# Patient Record
Sex: FEMALE | Race: WHITE | NOT HISPANIC OR LATINO | Employment: OTHER | URBAN - METROPOLITAN AREA
[De-identification: names, ages, dates, MRNs, and addresses within clinical notes are randomized per-mention and may not be internally consistent; named-entity substitution may affect disease eponyms.]

---

## 2017-01-19 ENCOUNTER — ALLSCRIPTS OFFICE VISIT (OUTPATIENT)
Dept: OTHER | Facility: OTHER | Age: 62
End: 2017-01-19

## 2017-01-19 ENCOUNTER — HOSPITAL ENCOUNTER (OUTPATIENT)
Dept: RADIOLOGY | Facility: HOSPITAL | Age: 62
Discharge: HOME | End: 2017-01-19
Payer: COMMERCIAL

## 2017-01-19 DIAGNOSIS — Z98.84 BARIATRIC SURGERY STATUS: ICD-10-CM

## 2017-01-19 PROCEDURE — 74250 X-RAY XM SM INT 1CNTRST STD: CPT

## 2017-01-19 RX ADMIN — IOHEXOL 900 ML: 350 INJECTION, SOLUTION INTRAVENOUS at 17:04

## 2017-01-26 ENCOUNTER — HOSPITAL ENCOUNTER (OUTPATIENT)
Dept: MAMMOGRAPHY | Facility: CLINIC | Age: 62
Discharge: HOME | End: 2017-01-26
Payer: COMMERCIAL

## 2017-01-26 ENCOUNTER — HOSPITAL ENCOUNTER (OUTPATIENT)
Dept: ULTRASOUND IMAGING | Facility: CLINIC | Age: 62
Discharge: HOME | End: 2017-01-26
Payer: COMMERCIAL

## 2017-01-26 DIAGNOSIS — Z12.31 ENCOUNTER FOR SCREENING MAMMOGRAM FOR MALIGNANT NEOPLASM OF BREAST: ICD-10-CM

## 2017-01-26 DIAGNOSIS — D24.1 BENIGN NEOPLASM OF RIGHT BREAST: ICD-10-CM

## 2017-01-26 PROCEDURE — 76642 ULTRASOUND BREAST LIMITED: CPT

## 2017-01-26 PROCEDURE — G0202 SCR MAMMO BI INCL CAD: HCPCS

## 2017-01-27 ENCOUNTER — TRANSCRIBE ORDERS (OUTPATIENT)
Dept: ADMINISTRATIVE | Facility: HOSPITAL | Age: 62
End: 2017-01-27

## 2017-01-27 DIAGNOSIS — N63.10 LUMP OF RIGHT BREAST: Primary | ICD-10-CM

## 2017-04-10 ENCOUNTER — ALLSCRIPTS OFFICE VISIT (OUTPATIENT)
Dept: OTHER | Facility: OTHER | Age: 62
End: 2017-04-10

## 2017-04-10 ENCOUNTER — TRANSCRIBE ORDERS (OUTPATIENT)
Dept: ADMINISTRATIVE | Facility: HOSPITAL | Age: 62
End: 2017-04-10

## 2017-04-10 DIAGNOSIS — D24.1 BENIGN NEOPLASM OF RIGHT BREAST: Primary | ICD-10-CM

## 2017-07-25 ENCOUNTER — ALLSCRIPTS OFFICE VISIT (OUTPATIENT)
Dept: OTHER | Facility: OTHER | Age: 62
End: 2017-07-25

## 2017-07-25 DIAGNOSIS — Z00.00 ENCOUNTER FOR GENERAL ADULT MEDICAL EXAMINATION WITHOUT ABNORMAL FINDINGS: ICD-10-CM

## 2017-07-25 DIAGNOSIS — M85.80 OTHER SPECIFIED DISORDERS OF BONE DENSITY AND STRUCTURE, UNSPECIFIED SITE: ICD-10-CM

## 2017-07-25 DIAGNOSIS — F41.9 ANXIETY DISORDER: ICD-10-CM

## 2017-07-25 DIAGNOSIS — Z98.84 BARIATRIC SURGERY STATUS: ICD-10-CM

## 2017-07-25 DIAGNOSIS — K91.2 POSTSURGICAL MALABSORPTION, NOT ELSEWHERE CLASSIFIED: ICD-10-CM

## 2017-08-04 ENCOUNTER — HOSPITAL ENCOUNTER (OUTPATIENT)
Dept: ULTRASOUND IMAGING | Facility: CLINIC | Age: 62
Discharge: HOME | End: 2017-08-04
Payer: COMMERCIAL

## 2017-08-04 DIAGNOSIS — D24.1 BENIGN NEOPLASM OF RIGHT BREAST: ICD-10-CM

## 2017-08-04 PROCEDURE — 76642 ULTRASOUND BREAST LIMITED: CPT

## 2017-10-10 ENCOUNTER — TRANSCRIBE ORDERS (OUTPATIENT)
Dept: ADMINISTRATIVE | Facility: HOSPITAL | Age: 62
End: 2017-10-10

## 2017-10-10 ENCOUNTER — ALLSCRIPTS OFFICE VISIT (OUTPATIENT)
Dept: OTHER | Facility: OTHER | Age: 62
End: 2017-10-10

## 2017-10-10 DIAGNOSIS — Z12.39 SCREENING BREAST EXAMINATION: Primary | ICD-10-CM

## 2017-11-06 ENCOUNTER — ALLSCRIPTS OFFICE VISIT (OUTPATIENT)
Dept: OTHER | Facility: OTHER | Age: 62
End: 2017-11-06

## 2017-11-07 NOTE — PROGRESS NOTES
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)   3  Intraductal papilloma of right breast (217) (D24 1)   4  Osteopenia (733 90) (M85 80)   5  Encounter for routine gynecological examination (V72 31) (Z01 419)    Plan  Encounter for routine gynecological examination    · Follow-up visit in 1 year Evaluation and Treatment  Follow-up  Status: Hold For -  Scheduling  Requested for: 19FUW8859   Ordered; For: Encounter for routine gynecological examination; Ordered By: Shauna Sanchez Performed:  Due: 36ZLP8917    Discussion/Summary    1  Yearly examâPap smear deferred, self breast awareness reviewed, calcium/vitamin D recommendations discussed, mammogram request given, colonoscopy as per specialist History of Intraductal papilloma of the right breastâpatient was counseled about the findings  Pathology report demonstrated no atypia and no hyperplasia  This is reassuring  The patient continues to see Dr Rae Bernstein and myself at alternating 6 month intervals  Mammogram from January 2017 was negative with right breast ultrasound August 2017 being negative as wellFamily history of breast cancer-the patient's maternal grandmother had bilateral breast cancer and her mother had breast cancer and had bilateral mastectomy  It is unclear whether her mother had bilateral breast cancer  The patient had genetic evaluation by Dr Rae Bernstein but was not a candidate for testingLoss of height/osteopenia forearm-patient had DEXA scan February 2014 with normal bone density of the hip and spine with osteopenia in the forearm  Recommend calcium/vitamin D/weight-bearing exercise  Repeat DEXA scan age 70 is recommendedAtrophic vagina-patient without symptoms  Bladder cancer-patient had urinalysis August 2016 with large blood  She was referred to Urology and had bladder cancer diagnosed  She is receiving BCG treatment, which will be a 2 year course   She has cystoscopy every 3 months and is followed by [facility]  My support was given  The patient was appreciative of referral to Urology with diagnosis  Other medical-patient had bowel obstruction related to prior gastric bypass  She had a tube placed to relieve the obstruction which was in place for a few months time, now removed  She did have hypokalemia along with AFib and had cardioversion  Fortunately, this has all resolved  Other-her son with Down syndrome age 25 is being  in May 2018  She is very happy about this  My congratulations were given  she'll follow-up in one year or as needed  The patient has the current Goals: Reviewed  The patent has the current Barriers: None  Patient is able to Self-Care  PATIENT EDUCATION RECORD She was given the following educational materials: Calcium/vitamin-D sheet   Chief Complaint  1  Visit For: Preventive General Multisystem Exam    History of Present Illness  HPI: The patient was seen today for yearly exam  Please see assessment plan for details  Review of Systems    Constitutional: No fever, no chills, feels well, no tiredness, no recent weight gain or loss  ENT: no ear ache, no loss of hearing, no nosebleeds or nasal discharge, no sore throat or hoarseness  Cardiovascular: no complaints of slow or fast heart rate, no chest pain, no palpitations, no leg claudication or lower extremity edema  Respiratory: no complaints of shortness of breath, no wheezing, no dyspnea on exertion, no orthopnea or PND  Breasts: no complaints of breast pain, breast lump or nipple discharge  Gastrointestinal: no complaints of abdominal pain, no constipation, no nausea or diarrhea, no vomiting, no bloody stools  Genitourinary: no complaints of dysuria, no incontinence, no pelvic pain, no dysmenorrhea, no vaginal discharge or abnormal vaginal bleeding  Musculoskeletal: no complaints of arthralgia, no myalgia, no joint swelling or stiffness, no limb pain or swelling     Integumentary: no complaints of skin rash or lesion, no itching or dry skin, no skin wounds  Neurological: no complaints of headache, no confusion, no numbness or tingling, no dizziness or fainting  ROS reviewed  Active Problems  1  Abnormal weight gain (783 1) (R63 5)   2  Anxiety (300 00) (F41 9)   3  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)   4  Bladder cancer (188 9) (C67 9)   5  Dyslipidemia (272 4) (E78 5)   6  Encounter for routine gynecological examination (V72 31) (Z01 419)   7  Encounter for screening mammogram for malignant neoplasm of breast (V76 12)   (Z12 31)   8  History of self breast exam   9  Intraductal papilloma of right breast (217) (D24 1)   10  Irregular heart beat (427 9) (I49 9)   11  Low vitamin B12 level (266 2) (E53 8)   12  Mitral valve insufficiency and aortic valve insufficiency (396 3) (I08 0)   13  Obesity (278 00) (E66 9)   14  Obstructive sleep apnea (327 23) (G47 33)   15  Osteoarthritis (715 90) (M19 90)   16  Osteopenia (733 90) (M85 80)   17  Panic disorder (300 01) (F41 0)   18  Postgastrectomy malabsorption (579 3) (K91 2,Z90 3)   19  Pre-diabetes (790 29) (R73 03)   20  Screening for osteoporosis (V82 81) (Z13 820)   21  Small bowel obstruction (560 9) (K56 609)   22  Status post gastric bypass for obesity (V45 86) (Z98 84)   23  Urgency of urination (788 63) (R39 15)    Past Medical History   · History of  2 (V22 2) (Z33 1)   · History of Hearing loss, unspecified laterality   · History of cardiac murmur (V12 59) (Z86 79)   · History of hypercholesterolemia (V12 29) (Z86 39)   · History of pregnancy (V13 29)   · History of self breast exam   · History of Menarche (V21 8)   · History of Morbid obesity (278 01) (E66 01)   · History of Osteoarthritis (V13 4)   · History of Post-menopausal bleeding (627 1) (N95 0)   · History of Uses birth control (V25 9) (Z30 9)   · History of Wears glasses (V49 89) (Z97 3)    The active problems and past medical history were reviewed and updated today        Surgical History   · History of Arthroscopy Knee   · History of Biopsy Breast Percutaneous Needle Core   · History of Breast Surgery Excision Of Breast Single Lesion   · History of  Section   · History of Dilation And Curettage   · History of Epigastric Hernia Repair   · History of Gastric Surgery   · History of Gastric Surgery For Morbid Obesity Bypass With Lynda-en-Y   · History of Hysteroscopy   · History of Intestinal Surgery   · History of Knee Replacement   · History of Partial Gastrectomy   · History of Repair Of Paraesophageal Hiatus Hernia   · History of Revision Of Gastrojejunostomy   · History of Rotator Cuff Repair   · History of Shoulder Surgery   · History of Surgical Treatment Of Missed     The surgical history was reviewed and updated today  Family History  Mother    · Family history of breast cancer in female (V13 2) (Z80 2)  Father    · Family history of Emphysema  Sister    · Family history of Emphysema lung  Maternal Grandmother    · Family history of breast cancer in female (V16 3) (Z80 2)  Paternal Aunt    · Family history of Osteoporosis    The family history was reviewed and updated today  Social History   · Denied: History of Alcohol Use (History)   · Caffeine Use   · Denied: History of Drug Use   · Marital History - Currently    · Never A Smoker   · One child   · [address]   · Uses Safety Equipment - Seatbelts  The social history was reviewed and updated today  The social history was reviewed and is unchanged  Current Meds   1  BCG Vaccine; Therapy: (Recorded:2017) to Recorded   2  Biotin 5000 MCG Oral Tablet; i po qd; Therapy: (Recorded:2016) to Recorded   3  Metoprolol Tartrate 50 MG Oral Tablet; Therapy: (Recorded:2017) to Recorded   4  Multi-Vitamin TABS; TAKE 1 TABLET DAILY; Therapy: (Recorded:2016) to Recorded   5  Paxil 40 MG Oral Tablet; Therapy: (Recorded:2016) to Recorded   6   Simvastatin 20 MG Oral Tablet; Therapy: (Recorded:19Jan2017) to Recorded   7  TraMADol HCl - 50 MG Oral Tablet; Therapy: 61EHM8800 to (Last JN:81MXY6526)  Requested for: 11IRO7167 Ordered   8  Vitamin B12 TABS; Therapy: (Recorded:19Jan2017) to Recorded   9  Vitamin D3 2000 UNIT Oral Tablet; Therapy: (Recorded:06Nov2017) to Recorded    Allergies  1  No Known Drug Allergies  2  No Known Food Allergies    Vitals   Recorded: 45YAL5567 43:97VH   Systolic 307, LUE, Sitting   Diastolic 82, LUE, Sitting   Height 5 ft    Weight 167 lb 2 oz   BMI Calculated 32 64   BSA Calculated 1 73   LMP POSTMENOPAUSAL     Physical Exam    Constitutional   General appearance: No acute distress, well appearing and well nourished  Neck   Neck: Normal, supple, trachea midline, no masses  Thyroid: Normal, no thyromegaly  Genitourinary   External genitalia: Normal and no lesions appreciated  Vagina: Normal, no lesions or dryness appreciated  -- Mildly atrophic, without erythema or lesions or discharge  Urethra: Normal     Urethral meatus: Normal     Bladder: Normal, soft, non-tender and no prolapse or masses appreciated  Cervix: Normal, no palpable masses  -- Mildly atrophic, without lesions or cervicitis  No Cervical motion tenderness  Uterus: Normal, non-tender, not enlarged, and no palpable masses  -- Mid position, top-normal size, nontender, without mass  Adnexa/parametria: Normal, non-tender and no fullness or masses appreciated  Anus, perineum, and rectum: Normal sphincter tone, no masses, and no prolapse  Chest   Breasts: Normal and no dimpling or skin changes noted  Abdomen   Abdomen: Normal, non-tender, and no organomegaly noted  Liver and spleen: No hepatomegaly or splenomegaly  Examination for hernias: No hernias appreciated  Lymphatic   Palpation of lymph nodes in neck, axillae, groin and/or other locations: No lymphadenopathy or masses noted      Skin   Skin and subcutaneous tissue: Normal skin turgor and no rashes  Palpation of skin and subcutaneous tissue: Normal     Psychiatric   Orientation to person, place, and time: Normal     Mood and affect: Normal        Future Appointments    Date/Time Provider Specialty Site   04/10/2018 10:15 AM NARCISO [place]   [facility]urgical [facility]   07/25/2018 09:30 AM Ruspantini, Mallissa Goodpasture, [facility]     Signatures   Electronically signed by : NARCISO Harding ; Nov 6 2017 11:15AM EST                       (Author)

## 2017-12-08 NOTE — PROGRESS NOTES
Assessment    1  Encounter for screening mammogram for malignant neoplasm of breast (V76 12) (Z12 31)   2  Intraductal papilloma of right breast (217) (D24 1)   3  Family history of breast cancer in female (V16 3) (Z80 3) : Mother    Plan  Encounter for screening mammogram for malignant neoplasm of breast    · Follow-up visit in 6 months Evaluation and Treatment  Follow-up  Status: Complete Done: 70OLV2708   Ordered;Encounter for screening mammogram for malignant neoplasm of breast; Ordered By: Roderick Rebolledo Performed:  Due: 73LRO5295; Last Updated By: Franc Prado; 10/10/2017 9:57:00 AM   · * MAMMO SCREENING BILATERAL W CAD; Status:Active; Requested TRICIA:[id number]:72HT;    Perform:[facility]; Order Comments:[facility] [address] [place]; MEV:[id number]; Last Updated Janae Hart; 10/10/2017 9:57:00 AM;Ordered;for screening mammogram for malignant neoplasm of breast; Ordered By:Becca Garcia; Discussion/Summary  56 yo female with a stable papilloma of the right breast  She also has family history of breast cancer  No concerns on exam today  I will make arrangements for her next mammogram and see her again at that time  If there are no concerns, I will likely follow her on an annual basis  Chief Complaint  Chief Complaint Free Text Note Form: Pt is here for 6 month breast follow uphas no complaints at this timeimagin2017 US R (B2)Dr Jean and Dr Ashley Stevenson      History of Present Illness  Diagnosis and Staging: intraductal papilloma right breast      Review of Systems  Complete Female ROS SurgOnc:  Constitutional: recent weight loss  Active Problems    1  Abnormal weight gain (783 1) (R63 5)   2  Anxiety (300 00) (F41 9)   3  Dyslipidemia (272 4) (E78 5)   4  Encounter for screening mammogram for malignant neoplasm of breast (V76 12) (Z12 31)   5  History of self breast exam   6  Irregular heart beat (427 9) (I49 9)   7   Low vitamin B12 level (266 2) (E53 8)   8  Mitral valve insufficiency and aortic valve insufficiency (396 3) (I08 0)   9  Obesity (278 00) (E66 9)   10  Obstructive sleep apnea (327 23) (G47 33)   11  Osteoarthritis (715 90) (M19 90)   12  Panic disorder (300 01) (F41 0)   13  Postgastrectomy malabsorption (579 3) (K91 2,Z90 3)   14  Pre-diabetes (790 29) (R73 03)   15  Screening for osteoporosis (V82 81) (Z13 820)   16  Small bowel obstruction (560 9) (K56 609)   17  Status post gastric bypass for obesity (V45 86) (Z98 84)   18  Urgency of urination (788 63) (R39 15)    Past Medical History  1  History of  2 (V22 2) (Z33 1)   2  History of Hearing loss, unspecified laterality   3  History of cardiac murmur (V12 59) (Z86 79)   4  History of hypercholesterolemia (V12 29) (Z86 39)   5  History of pregnancy (V13 29)   6  History of self breast exam   7  History of Menarche (V21 8)   8  History of Morbid obesity (278 01) (E66 01)   9  History of Osteoarthritis (V13 4)   10  History of Post-menopausal bleeding (627 1) (N95 0)   11  History of Uses birth control (V25 9) (Z30 9)   12  History of Wears glasses (V49 89) (Z97 3)    Surgical History  1  History of Arthroscopy Knee   2  History of Biopsy Breast Percutaneous Needle Core   · 14   3  History of Breast Surgery Excision Of Breast Single Lesion   · 25 years ago   4  History of  Section   5  History of Dilation And Curettage   6  History of Epigastric Hernia Repair   7  History of Gastric Surgery   · Takedown of dural RNY  8  History of Gastric Surgery For Morbid Obesity Bypass With Lynda-en-Y   9  History of Hysteroscopy   10  History of Intestinal Surgery   11  History of Knee Replacement   · right and left knee replacement   12  History of Partial Gastrectomy   13  History of Repair Of Paraesophageal Hiatus Hernia   14  History of Revision Of Gastrojejunostomy   15  History of Rotator Cuff Repair   · left and right   16  History of Shoulder Surgery   17   History of Surgical Treatment Of Missed   Surgical History Reviewed: The surgical history was reviewed and updated today  Family History  Mother    1  Family history of breast cancer in female (V16 3) (Z80 3)  Father    2  Family history of Emphysema  Sister    3  Family history of Emphysema lung  Maternal Grandmother    4  Family history of breast cancer in female (V16 3) (Z80 3)  Paternal Aunt    11  Family history of Osteoporosis  Family History Reviewed: The family history was reviewed and updated today  Social History     · Denied: History of Alcohol Use (History)   · Caffeine Use   · Denied: History of Drug Use   · Marital History - Currently    · Never A Smoker   · One child   · [address]   · Uses Safety Equipment - Seatbelts  Social History Reviewed: The social history was reviewed and updated today  The social history was reviewed and is unchanged  Current Meds   1  B Complex TABS; Therapy: (Recorded:2017) to Recorded   2  Biotin 5000 MCG Oral Tablet; i po qd; Therapy: (Recorded:2016) to Recorded   3  Diclofenac Sodium 50 MG Oral Tablet Delayed Release; Therapy: (Recorded:2017) to Recorded   4  Metoprolol Tartrate 50 MG Oral Tablet; Therapy: (Recorded:2017) to Recorded   5  Multi-Vitamin TABS; TAKE 1 TABLET DAILY; Therapy: (Recorded:2016) to Recorded   6  Paxil 40 MG Oral Tablet; Therapy: (Recorded:2016) to Recorded   7  Simvastatin 20 MG Oral Tablet; Therapy: (Recorded:2017) to Recorded   8  TraMADol HCl - 50 MG Oral Tablet; Therapy: 64VRM9841 to (Last EQ:52GBC6987)  Requested for: 91CEY7873 Ordered   9  Vitamin B12 TABS; Therapy: (Recorded:2017) to Recorded  Medication List Reviewed: The medication list was reviewed and updated today  Allergies  1   No Known Drug Allergies    Vitals  Vital Signs    Recorded: 68KRA4944 09:26AM   Temperature 99 9 F   Heart Rate 67   Respiration 12   Systolic 218   Diastolic 94 Height 5 ft    Weight 165 lb    BMI Calculated 32 22   BSA Calculated 1 72   O2 Saturation 97       Physical Exam   Constitutional: General appearance: The Patient is well-developed, well-nourished female who appears her stated age in no acute distress  She is pleasant and talkative  Neuro: Grossly nonfocal  -- Orientation to person, place and time: Normal  -- Mood and affect: Normal    Lymphatic: no evidence of cervical adenopathy bilaterally  -- no evidence of axillary adenopathy bilaterally  Skin: Examination of Breast: Abnormal   Breast: Examination of both breasts revealed fibrocystic changes  Examination of the right breast revealed no erythema,-- no swelling-- and-- no tenderness  Examination of the left breast revealed no erythema,-- no swelling-- and-- no tenderness  Nipples appeared normal No discharge was noted from the nipples  No breast masses were palpable  Axillary nodes: no enlarged nodes  Results/Data  Diagnostic Studies Reviewed Surg Onc:  X-ray Review 8/4/17 right breast ultrasound stable  Future Appointments    Date/Time Provider Specialty Site   04/10/2018 10:15 AM NARCISO Sin  [facility]   07/25/2018 09:30 AM Citlali Thomas, [facility]     End of Encounter Meds    1  Paxil 40 MG Oral Tablet (PARoxetine HCl); Therapy: (Recorded:12Oct2016) to Recorded    2  Biotin 5000 MCG Oral Tablet; i po qd; Therapy: (Recorded:12Oct2016) to Recorded   3  Multi-Vitamin TABS; TAKE 1 TABLET DAILY; Therapy: (Recorded:12Oct2016) to Recorded    4  BCG Vaccine; Therapy: (450 45 295) to Recorded   5  Metoprolol Tartrate 50 MG Oral Tablet; Therapy: (Recorded:19Jan2017) to Recorded   6  Simvastatin 20 MG Oral Tablet; Therapy: (Recorded:19Jan2017) to Recorded   7  TraMADol HCl - 50 MG Oral Tablet; Therapy: 45ITU8395 to (Last HY:50UJW5627)  Requested for: 73WZG9900 Ordered   8  Vitamin B12 TABS;  Therapy: (Recorded:19Jan2017) to Recorded   9  Vitamin D3 2000 UNIT Oral Tablet;  Therapy: [phone]) to Recorded    Signatures   Electronically signed by : NARCISO Lezama ; Dec  7 2017  4:51PM EST                       (Author)

## 2018-01-10 NOTE — MISCELLANEOUS
Message   Recorded as Task   Date: 08/15/2016 01:45 PM, Created By: Chuckie Montanez   Task Name: Go to Result   Assigned To: Maikel Godwin   Regarding Patient: Kelly Rodrigez, Status: Active   CommentNoel Messing - 15 Aug 2016 9:45 AM     TASK CREATED  urine culture is negative, but UA with large blood  Given urine symptoms, rec see urology   Monique Mancera - 16 Aug 2016 5:04 AM     TASK REASSIGNED: Previously Assigned To Maikel Godwin      please have someone from your staff call this pt for an appt    Jeane Arriaza - 17 Aug 2016 12:24 PM     TASK REPLIED TO: Previously Assigned To [employer] actually shows no RBC  We will see patient next available  THank you  Marycruz Gil        Active Problems    1  Abnormal weight gain (783 1) (R63 5)   2  Anxiety (300 00) (F41 9)   3  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)   4  Breast disorder (611 9) (N64 9)   5  Breast self examination education, encounter for (V65 49) (Z71 89)   6  Cervical cancer screening (V76 2) (Z12 4)   7  Dense breasts (793 82) (R92 2)   8  Dyslipidemia (272 4) (E78 5)   9  Encounter for gynecological examination with Papanicolaou smear of cervix   (V72 31,V76 2) (Z01 419,Z12 4)   10  Encounter for screening mammogram for malignant neoplasm of breast (V76 12)    (Z12 31)   11  Frequency of urination (788 41) (R35 0)   12  History of self breast exam   13  Intraductal papilloma of right breast (217) (D24 1)   14  Irregular heart beat (427 9) (I49 9)   15  Mitral valve insufficiency and aortic valve insufficiency (396 3) (I08 0)   16  Obesity (278 00) (E66 9)   17  Obstructive sleep apnea (327 23) (G47 33)   18  Osteoarthritis (715 90) (M19 90)   19  Panic disorder (300 01) (F41 0)   20  Postgastrectomy malabsorption (579 3) (K91 2,Z90 3)   21  Pre-diabetes (790 29) (R73 09)   22  Screening for HPV (human papillomavirus) (V73 81) (Z11 51)   23  Screening for osteoporosis (V82 81) (Z13 820)   24   Status post gastric bypass for obesity (V45 86) (Z98 84)   25  Urgency of urination (788 63) (R39 15)    Current Meds   1  B Complex + C TR TBCR Recorded   2  Biotin 5000 MCG Oral Tablet; i po qd; Therapy: (7599-1301447) to Recorded   3  Calcium Citrate + D3 TABS; 500 mg i po tid qd; Therapy: (9118-5007797) to Recorded   4  Multivitamins Oral Capsule Recorded   5  Paxil 40 MG Oral Tablet (PARoxetine HCl); Therapy: (Naeem Kem) to Recorded   6  Toprol  MG Oral Tablet Extended Release 24 Hour (Metoprolol Succinate ER); Therapy: 75UWX8852 to (Last Rx:65Dui6385)  Requested for: 69GUZ4201 Ordered   7  TraMADol HCl - 50 MG Oral Tablet; Therapy: 79UKG8721 to (Last PE:73ONA9928)  Requested for: 98CAJ0818 Ordered   8  Vitamin D 2000 UNIT Oral Tablet Recorded   9  Voltaren-XR TB24 (Diclofenac Sodium ER) Recorded   10  Zocor 20 MG Oral Tablet (Simvastatin) Recorded    Allergies    1   No Known Drug Allergies    Signatures   Electronically signed by : Alo Antoine, ; Aug 18 2016  8:02AM EST                       (Author)

## 2018-01-11 NOTE — MISCELLANEOUS
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)   4  Breast disorder (611 9) (N64 9)   5  Breast self examination education, encounter for (V65 49) (Z71 89)   6  Cervical cancer screening (V76 2) (Z12 4)   7  Dense breasts (793 82) (R92 2)   8  Dyslipidemia (272 4) (E78 5)   9  Encounter for gynecological examination with Papanicolaou smear of cervix   (V72 31,V76 2) (Z01 419,Z12 4)   10  Encounter for screening mammogram for malignant neoplasm of breast (V76 12)    (Z12 31)   11  History of self breast exam   12  Irregular heart beat (427 9) (I49 9)   13  Mitral valve insufficiency and aortic valve insufficiency (396 3) (I08 0)   14  Obesity (278 00) (E66 9)   15  Obstructive sleep apnea (327 23) (G47 33)   16  Osteoarthritis (715 90) (M19 90)   17  Panic disorder (300 01) (F41 0)   18  Postgastrectomy malabsorption (579 3) (K91 2)   19  Pre-diabetes (790 29) (R73 09)   20  Screening for HPV (human papillomavirus) (V73 81) (Z11 51)   21  Screening for osteoporosis (V82 81) (Z13 820)   22  Status post gastric bypass for obesity (V45 86) (Z98 84)    Current Meds   1  B Complex + C TR TBCR Recorded   2  Biotin 5000 MCG Oral Tablet; i po qd; Therapy: (([phone]) to Recorded   3  Calcium Citrate + D3 TABS; 500 mg i po tid qd; Therapy: (([phone] [phone]) to Recorded   4  Multivitamins Oral Capsule Recorded   5  Paxil 40 MG Oral Tablet (PARoxetine HCl); Therapy: (Reji Needle) to Recorded   6   Toprol  MG Oral Tablet Extended Release 24 Hour (Metoprolol Succinate ER); Therapy: 55UTS0988 to (Last Rx:96Ouo4497)  Requested for: 31SYE0823 Ordered   7  TraMADol HCl - 50 MG Oral Tablet; Therapy: 80OHL4038 to (Last CR:33VNH9115)  Requested for: 66SNO5607 Ordered   8  Vitamin D 2000 UNIT Oral Tablet Recorded   9  Voltaren-XR TB24 (Diclofenac Sodium ER) Recorded   10  Zocor 20 MG Oral Tablet (Simvastatin) Recorded    Allergies    1  No Known Drug Allergies    Plan  Breast disorder    · US BREAST RIGHT LIMITED; Status:Hold For - Scheduling,Retrospective Authorization;   Requested for:03Twj4860;     Signatures   Electronically signed by : Maude Gale, ; Feb 2 2016  8:06AM EST                       (Author)

## 2018-01-12 VITALS
BODY MASS INDEX: 31.41 KG/M2 | HEIGHT: 60 IN | HEART RATE: 68 BPM | RESPIRATION RATE: 18 BRPM | SYSTOLIC BLOOD PRESSURE: 120 MMHG | TEMPERATURE: 98.2 F | DIASTOLIC BLOOD PRESSURE: 72 MMHG | WEIGHT: 160 LBS

## 2018-01-12 VITALS
BODY MASS INDEX: 32.39 KG/M2 | TEMPERATURE: 99.9 F | HEIGHT: 60 IN | HEART RATE: 67 BPM | DIASTOLIC BLOOD PRESSURE: 94 MMHG | OXYGEN SATURATION: 97 % | WEIGHT: 165 LBS | RESPIRATION RATE: 12 BRPM | SYSTOLIC BLOOD PRESSURE: 154 MMHG

## 2018-01-12 NOTE — MISCELLANEOUS
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)   4  Breast disorder (611 9) (N64 9)   5  Breast self examination education, encounter for (V65 49) (Z71 89)   6  Cervical cancer screening (V76 2) (Z12 4)   7  Dense breasts (793 82) (R92 2)   8  Dyslipidemia (272 4) (E78 5)   9  Encounter for gynecological examination with Papanicolaou smear of cervix   (V72 31,V76 2) (Z01 419,Z12 4)   10  Encounter for screening mammogram for malignant neoplasm of breast (V76 12)    (Z12 31)   11  History of self breast exam   12  Irregular heart beat (427 9) (I49 9)   13  Mitral valve insufficiency and aortic valve insufficiency (396 3) (I08 0)   14  Obesity (278 00) (E66 9)   15  Obstructive sleep apnea (327 23) (G47 33)   16  Osteoarthritis (715 90) (M19 90)   17  Panic disorder (300 01) (F41 0)   18  Postgastrectomy malabsorption (579 3) (K91 2)   19  Pre-diabetes (790 29) (R73 09)   20  Screening for HPV (human papillomavirus) (V73 81) (Z11 51)   21  Screening for osteoporosis (V82 81) (Z13 820)   22  Status post gastric bypass for obesity (V45 86) (Z98 84)    Current Meds   1  B Complex + C TR TBCR Recorded   2  Biotin 5000 MCG Oral Tablet; i po qd; Therapy: ([phone]) to Recorded   3  Calcium Citrate + D3 TABS; 500 mg i po tid qd; Therapy: ([phone]) to Recorded   4  Multivitamins Oral Capsule Recorded   5  Paxil 40 MG Oral Tablet (PARoxetine HCl); Therapy: (Julian Dick) to Recorded   6  Toprol  MG Oral Tablet Extended Release 24 Hour (Metoprolol Succinate ER);    Therapy: 81TTU3964 to (Last Rx:00Ejy0368)  Requested for: 15FTA2675 Ordered   7  TraMADol HCl - 50 MG Oral Tablet; Therapy: 56NGU7118 to (Last CO:20ECZ0633)  Requested for: 92VFU9210 Ordered   8  Vitamin D 2000 UNIT Oral Tablet Recorded   9  Voltaren-XR TB24 (Diclofenac Sodium ER) Recorded   10  Zocor 20 MG Oral Tablet (Simvastatin) Recorded    Allergies    1   No Known Drug Allergies    Signatures   Electronically signed by : Octavio Culver, ; Jan 25 2016  2:26PM EST                       (Author)

## 2018-01-13 VITALS
HEIGHT: 60 IN | TEMPERATURE: 98.3 F | RESPIRATION RATE: 14 BRPM | SYSTOLIC BLOOD PRESSURE: 140 MMHG | BODY MASS INDEX: 31.81 KG/M2 | HEART RATE: 61 BPM | WEIGHT: 162.03 LBS | DIASTOLIC BLOOD PRESSURE: 86 MMHG

## 2018-01-14 VITALS
RESPIRATION RATE: 16 BRPM | SYSTOLIC BLOOD PRESSURE: 140 MMHG | WEIGHT: 159 LBS | TEMPERATURE: 98.8 F | BODY MASS INDEX: 31.22 KG/M2 | HEART RATE: 73 BPM | HEIGHT: 60 IN | DIASTOLIC BLOOD PRESSURE: 84 MMHG

## 2018-01-14 VITALS
BODY MASS INDEX: 32.81 KG/M2 | SYSTOLIC BLOOD PRESSURE: 128 MMHG | DIASTOLIC BLOOD PRESSURE: 82 MMHG | WEIGHT: 167.13 LBS | HEIGHT: 60 IN

## 2018-01-15 NOTE — MISCELLANEOUS
Message   Recorded as Task   Date: 08/05/2016 08:01 AM, Created By: Valerie South   Task Name: Go to Result   Assigned To: Vanita Villalobos   Regarding Patient: Jalen Tidwell, Status: In Progress   CommentEspinozaafua LemaRenetta - 05 Aug 2016 8:01 AM     TASK CREATED  Please check to see if radiology contacted the patient about this result  If not, would recommend office visit with me to discuss   Nadiya Wiley - 05 Aug 2016 8:35 AM     TASK IN PROGRESS   Pt was not informed  Will schedule appt to discuss  Active Problems    1  Abnormal weight gain (783 1) (R63 5)   2  Anxiety (300 00) (F41 9)   3  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)   4  Breast disorder (611 9) (N64 9)   5  Breast self examination education, encounter for (V65 49) (Z71 89)   6  Cervical cancer screening (V76 2) (Z12 4)   7  Dense breasts (793 82) (R92 2)   8  Dyslipidemia (272 4) (E78 5)   9  Encounter for gynecological examination with Papanicolaou smear of cervix   (V72 31,V76 2) (Z01 419,Z12 4)   10  Encounter for screening mammogram for malignant neoplasm of breast (V76 12)    (Z12 31)   11  History of self breast exam   12  Irregular heart beat (427 9) (I49 9)   13  Mitral valve insufficiency and aortic valve insufficiency (396 3) (I08 0)   14  Obesity (278 00) (E66 9)   15  Obstructive sleep apnea (327 23) (G47 33)   16  Osteoarthritis (715 90) (M19 90)   17  Panic disorder (300 01) (F41 0)   18  Postgastrectomy malabsorption (579 3) (K91 2,Z90 3)   19  Pre-diabetes (790 29) (R73 09)   20  Screening for HPV (human papillomavirus) (V73 81) (Z11 51)   21  Screening for osteoporosis (V82 81) (Z13 820)   22  Status post gastric bypass for obesity (V45 86) (Z98 84)    Current Meds   1  B Complex + C TR TBCR Recorded   2  Biotin 5000 MCG Oral Tablet; i po qd; Therapy: (779 498 100) to Recorded   3  Calcium Citrate + D3 TABS; 500 mg i po tid qd; Therapy: (779 498 100) to Recorded   4   Multivitamins Oral Capsule Recorded   5  Paxil 40 MG Oral Tablet (PARoxetine HCl); Therapy: (Filbert Sos) to Recorded   6  Toprol  MG Oral Tablet Extended Release 24 Hour (Metoprolol Succinate ER); Therapy: 68WIB0558 to (Last Rx:36Wne2202)  Requested for: 26WXR9898 Ordered   7  TraMADol HCl - 50 MG Oral Tablet; Therapy: 16NVI4105 to (Last ME:81OBE3349)  Requested for: 10HBM1542 Ordered   8  Vitamin D 2000 UNIT Oral Tablet Recorded   9  Voltaren-XR TB24 (Diclofenac Sodium ER) Recorded   10  Zocor 20 MG Oral Tablet (Simvastatin) Recorded    Allergies    1   No Known Drug Allergies    Signatures   Electronically signed by : Elisabet Bundy, ; Aug  5 2016  8:43AM EST                       (Author)

## 2018-01-16 NOTE — MISCELLANEOUS
Message   Date: 05 Aug 2016 8:53 AM EST, Recorded By: Magnus De Souza   Calling For: Magnus De Souza   Caller: Tigist Fowler, Self   Phone: [phone] (Home), [phone] (Work)   Pt called back for copy of breast bx path report  Will  today  Has appt to discuss on 8/12/16  Active Problems    1  Abnormal weight gain (783 1) (R63 5)   2  Anxiety (300 00) (F41 9)   3  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)   4  Breast disorder (611 9) (N64 9)   5  Breast self examination education, encounter for (V65 49) (Z71 89)   6  Cervical cancer screening (V76 2) (Z12 4)   7  Dense breasts (793 82) (R92 2)   8  Dyslipidemia (272 4) (E78 5)   9  Encounter for gynecological examination with Papanicolaou smear of cervix   (V72 31,V76 2) (Z01 419,Z12 4)   10  Encounter for screening mammogram for malignant neoplasm of breast (V76 12)    (Z12 31)   11  History of self breast exam   12  Irregular heart beat (427 9) (I49 9)   13  Mitral valve insufficiency and aortic valve insufficiency (396 3) (I08 0)   14  Obesity (278 00) (E66 9)   15  Obstructive sleep apnea (327 23) (G47 33)   16  Osteoarthritis (715 90) (M19 90)   17  Panic disorder (300 01) (F41 0)   18  Postgastrectomy malabsorption (579 3) (K91 2,Z90 3)   19  Pre-diabetes (790 29) (R73 09)   20  Screening for HPV (human papillomavirus) (V73 81) (Z11 51)   21  Screening for osteoporosis (V82 81) (Z13 820)   22  Status post gastric bypass for obesity (V45 86) (Z98 84)    Current Meds   1  B Complex + C TR TBCR Recorded   2  Biotin 5000 MCG Oral Tablet; i po qd; Therapy: [phone]) to Recorded   3  Calcium Citrate + D3 TABS; 500 mg i po tid qd; Therapy: [phone]) to Recorded   4  Multivitamins Oral Capsule Recorded   5  Paxil 40 MG Oral Tablet (PARoxetine HCl); Therapy: (\[facility]\"") to Recorded   6  Toprol  MG Oral Tablet Extended Release 24 Hour (Metoprolol Succinate ER);    Therapy: 48KRG8659 to (Last IV:39GSX6026)  Requested for: 82MEQ1338 Ordered   7  TraMADol HCl - 50 MG Oral Tablet; Therapy: 37YDO6627 to (Last L07GZK7674)  Requested for: 22WSP9946 Ordered   8  Vitamin D 2000 UNIT Oral Tablet Recorded   9  Voltaren-XR TB24 (Diclofenac Sodium ER) Recorded   10  Zocor 20 MG Oral Tablet (Simvastatin) Recorded    Allergies    1   No Known Drug Allergies    Signatures   Electronically signed by : Brant Maurer, ; Aug  5 2016  8:54AM EST                       (Author)

## 2018-01-16 NOTE — MISCELLANEOUS
Message   Recorded as Task   Date: 01/13/2016 10:47 PM, Created By: Ross Stewart   Task Name: Go to Result   Assigned To: Fabiola Fernandez   Regarding Patient: Jose Olson, Status: Active   CommentOrnadineo Sindi - 13 Jan 2016 10:47 PM     TASK CREATED  Patient needs right breast mammogram in 6 months time  Please arrange  Thanks    Slip mailed to patient  Active Problems    1  Abnormal weight gain (783 1) (R63 5)   2  Anxiety (300 00) (F41 9)   3  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)   4  Breast disorder (611 9) (N64 9)   5  Breast self examination education, encounter for (V65 49) (Z71 89)   6  Cervical cancer screening (V76 2) (Z12 4)   7  Dense breasts (793 82) (R92 2)   8  Dyslipidemia (272 4) (E78 5)   9  Encounter for gynecological examination with Papanicolaou smear of cervix   (V72 31,V76 2) (Z01 419,Z12 4)   10  Encounter for screening mammogram for malignant neoplasm of breast (V76 12)    (Z12 31)   11  History of self breast exam   12  Irregular heart beat (427 9) (I49 9)   13  Mitral valve insufficiency and aortic valve insufficiency (396 3) (I08 0)   14  Obesity (278 00) (E66 9)   15  Obstructive sleep apnea (327 23) (G47 33)   16  Osteoarthritis (715 90) (M19 90)   17  Panic disorder (300 01) (F41 0)   18  Postgastrectomy malabsorption (579 3) (K91 2)   19  Pre-diabetes (790 29) (R73 09)   20  Screening for HPV (human papillomavirus) (V73 81) (Z11 51)   21  Screening for osteoporosis (V82 81) (Z13 820)   22  Status post gastric bypass for obesity (V45 86) (Z98 84)    Current Meds   1  B Complex + C TR TBCR Recorded   2  Biotin 5000 MCG Oral Tablet; i po qd; Therapy: [phone]) to Recorded   3  Calcium Citrate + D3 TABS; 500 mg i po tid qd; Therapy: [phone]) to Recorded   4  Multivitamins Oral Capsule Recorded   5  Paxil 40 MG Oral Tablet (PARoxetine HCl); Therapy: (Neita Evens) to Recorded   6   Toprol  MG Oral Tablet Extended Release 24 Hour (Metoprolol Succinate ER); Therapy: 00JWF7425 to (Last Rx:99Ekm5046)  Requested for: 59TMF6144 Ordered   7  TraMADol HCl - 50 MG Oral Tablet; Therapy: 19CRO5515 to (Last LR:88KSX3004)  Requested for: 06GUG4463 Ordered   8  Vitamin D 2000 UNIT Oral Tablet Recorded   9  Voltaren-XR TB24 (Diclofenac Sodium ER) Recorded   10  Zocor 20 MG Oral Tablet (Simvastatin) Recorded    Allergies    1  No Known Drug Allergies    Plan  Breast disorder    · US BREAST RIGHT LIMITED; Status:Hold For - Scheduling,Retrospective Authorization;   Requested for:25Hhj9724;     Signatures   Electronically signed by : Hector Phelan, ; Gregory 15 2016 10:14AM EST                       (Author)

## 2018-01-29 ENCOUNTER — TRANSCRIBE ORDERS (OUTPATIENT)
Dept: MAMMOGRAPHY | Facility: CLINIC | Age: 63
End: 2018-01-29

## 2018-01-29 ENCOUNTER — HOSPITAL ENCOUNTER (OUTPATIENT)
Dept: MAMMOGRAPHY | Facility: CLINIC | Age: 63
Discharge: HOME | End: 2018-01-29
Payer: COMMERCIAL

## 2018-01-29 DIAGNOSIS — Z12.31 ENCOUNTER FOR SCREENING MAMMOGRAM FOR MALIGNANT NEOPLASM OF BREAST: ICD-10-CM

## 2018-01-29 DIAGNOSIS — Z12.39 SCREENING BREAST EXAMINATION: Primary | ICD-10-CM

## 2018-01-29 DIAGNOSIS — Z12.39 SCREENING BREAST EXAMINATION: ICD-10-CM

## 2018-01-29 PROCEDURE — 77067 SCR MAMMO BI INCL CAD: CPT

## 2018-04-10 ENCOUNTER — OFFICE VISIT (OUTPATIENT)
Dept: SURGICAL ONCOLOGY | Facility: CLINIC | Age: 63
End: 2018-04-10
Payer: COMMERCIAL

## 2018-04-10 VITALS
RESPIRATION RATE: 18 BRPM | TEMPERATURE: 99.1 F | SYSTOLIC BLOOD PRESSURE: 120 MMHG | HEART RATE: 98 BPM | HEIGHT: 60 IN | WEIGHT: 170 LBS | BODY MASS INDEX: 33.38 KG/M2 | DIASTOLIC BLOOD PRESSURE: 80 MMHG

## 2018-04-10 DIAGNOSIS — Z80.3 FAMILY HISTORY OF BREAST CANCER IN FEMALE: Primary | ICD-10-CM

## 2018-04-10 DIAGNOSIS — Z12.31 SCREENING MAMMOGRAM, ENCOUNTER FOR: ICD-10-CM

## 2018-04-10 DIAGNOSIS — R92.2 DENSE BREAST TISSUE: ICD-10-CM

## 2018-04-10 DIAGNOSIS — D24.1 INTRADUCTAL PAPILLOMA OF BREAST, RIGHT: ICD-10-CM

## 2018-04-10 PROCEDURE — 99213 OFFICE O/P EST LOW 20 MIN: CPT | Performed by: SURGERY

## 2018-04-10 RX ORDER — APIXABAN 5 MG/1
TABLET, FILM COATED ORAL
COMMUNITY
Start: 2018-03-26

## 2018-04-10 NOTE — LETTER
April 10, 2018     Eleanor Hsu MD  [address]  [address]    Patient: Jesús Tomlinson   YOB: 1955   Date of Visit: 4/10/2018       Dear Dr Melyssa Mendez:    Thank you for referring Laura Steve to me for evaluation  Below are my notes for this consultation  If you have questions, please do not hesitate to call me  I look forward to following your patient along with you  Sincerely,        Adelaida Schmidt MD        CC: No Recipients  Adelaida Schmidt MD  4/10/2018 10:49 AM  Sign at close encounter     Surgical Oncology Follow Up       [facility]  [address] [facility]  1955  [id number]  [facility]  [facility] [address] [address]    Chief Complaint   Patient presents with    Breast Problem     Pt is here for 6 month follow up        Assessment/Plan   Diagnoses and all orders for this visit:    Family history of breast cancer in female    Intraductal papilloma of breast, right    Dense breast tissue    Screening mammogram, encounter for  -     Mammo screening bilateral w 3d & cad; Future            Advance Care Planning/Advance Directives:  Did not discuss  with the patient  Oncology History:     No history exists  History of Present Illness: follow up  -Interval History:none    Review of Systems:  Review of Systems   Constitutional: Negative  Negative for appetite change and fever  Eyes: Negative  Respiratory: Negative for shortness of breath  Cardiovascular: Negative  Gastrointestinal: Negative  Endocrine: Negative  Genitourinary: Negative  Musculoskeletal: Negative  Negative for arthralgias and myalgias  Skin: Negative  Allergic/Immunologic: Negative  Neurological: Negative  Hematological: Negative  Negative for adenopathy  Does not bruise/bleed easily  Psychiatric/Behavioral: Negative  Patient Active Problem List   Diagnosis    Intraductal papilloma of breast, right    Family history of breast cancer in female    Dense breast tissue    Screening mammogram, encounter for     Past Medical History:   Diagnosis Date    Abnormal weight gain     Anxiety     Dyslipidemia     Gastric bypass status for obesity     H/O cardiac murmur     H/O hearing loss     H/O: osteoarthritis     History of postmenopausal bleeding     Hx of hypercholesterolemia     Intraductal papilloma of breast, right     Irregular heart beat     Low vitamin B12 level     Mitral valve insufficiency and aortic valve insufficiency     Morbid obesity (HCC)     Obesity     Obstructive sleep apnea     Osteoarthritis     Panic disorder     Postgastrectomy malabsorption     Prediabetes     Small bowel obstruction (HCC)     Urgency of urination     Wears glasses      Past Surgical History:   Procedure Laterality Date    ARTHROSCOPY KNEE      BREAST BIOPSY  2016    needle core    BREAST SURGERY      25 yrs ago excision of single breast lesion     SECTION      DILATION AND CURETTAGE OF UTERUS      EPIGASTRIC HERNIA REPAIR      HIATAL HERNIA REPAIR      HYSTERECTOMY      PARTIAL GASTRECTOMY      REPLACEMENT TOTAL KNEE BILATERAL      ROTATOR CUFF REPAIR      bilat    LEO-EN-Y PROCEDURE      SHOULDER SURGERY      SMALL INTESTINE SURGERY      STOMACH SURGERY      STOMACH SURGERY      revision of gastrojejunostomy    THERAPEUTIC       treatment of missed      Family History   Problem Relation Age of Onset    Breast cancer Mother     Breast cancer Maternal Grandmother      Social History     Social History    Marital status: /Civil Union     Spouse name: N/A    Number of children: N/A    Years of education: N/A     Occupational History    Not on file       Social History Main Topics    Smoking status: Never Smoker    Smokeless tobacco: Never Used    Alcohol use No    Drug use: Unknown    Sexual activity: Not on file     Other Topics Concern    Not on file     Social History Narrative    No narrative on file       Current Outpatient Prescriptions:     B Complex-C (B-COMPLEX WITH VITAMIN C) tablet, Take 1 tablet by mouth daily  , Disp: , Rfl:     bcg vaccine (THERACYS) 81 MG/VIAL, by Intravesical route, Disp: , Rfl:     Biotin 5000 MCG TABS, Take by mouth , Disp: , Rfl:     Calcium Citrate-Vitamin D 500-500 MG-UNIT PACK, Take by mouth , Disp: , Rfl:     Cholecalciferol (VITAMIN D) 2000 UNITS CAPS, Take by mouth , Disp: , Rfl:     Diclofenac Sodium ER (VOLTAREN-XR) 100 MG 24 hr tablet, Take 100 mg by mouth daily  , Disp: , Rfl:     ELIQUIS 5 MG, , Disp: , Rfl:     metoprolol succinate (TOPROL-XL) 100 mg 24 hr tablet, Take 100 mg by mouth daily  , Disp: , Rfl:     Multiple Vitamin (MULTIVITAMIN) tablet, Take 1 tablet by mouth daily  , Disp: , Rfl:     PARoxetine (PAXIL) 40 MG tablet, Take 40 mg by mouth every morning , Disp: , Rfl:     simvastatin (ZOCOR) 20 mg tablet, Take 20 mg by mouth daily at bedtime  , Disp: , Rfl:     traMADol (ULTRAM) 50 mg tablet, Take 50 mg by mouth every 6 (six) hours as needed for moderate pain , Disp: , Rfl:   No Known Allergies    The following portions of the patient's history were reviewed and updated as appropriate: allergies, current medications, past family history, past medical history, past social history, past surgical history and problem list         Vitals:    04/10/18 1028   BP: 120/80   Pulse: 98   Resp: 18   Temp: 99 1 °F (37 3 °C)       Physical Exam   Constitutional: She is oriented to person, place, and time  She appears well-developed and well-nourished  HENT:   Head: Normocephalic and atraumatic  Pulmonary/Chest: Right breast exhibits no inverted nipple, no mass, no nipple discharge, no skin change and no tenderness   Left breast exhibits no inverted nipple, no mass, no nipple discharge, no skin change and no tenderness  Lymphadenopathy:        Right axillary: No pectoral and no lateral adenopathy present  Left axillary: No pectoral and no lateral adenopathy present  Right: No supraclavicular adenopathy present  Left: No supraclavicular adenopathy present  Neurological: She is alert and oriented to person, place, and time  Psychiatric: She has a normal mood and affect  Results:      Imaging  01/29/2018 bilateral screening mammogram is benign BI-RADS two density three    I reviewed the above  imaging data  Discussion/Summary:  26-year-old female with a history of breast cancer in her mother, intraductal papilloma of the right breast and dense breast tissue  There are no concerns on examination today  Her recent mammogram was benign  I will continue to follow her on an annual basis along with her gynecologist, Dr Wenceslao Odell  I advised her to return sooner should the need arise

## 2018-04-10 NOTE — PROGRESS NOTES
Surgical Oncology Follow Up       [facility]  [facility] [place]  [address] [facility]  1955  [id number]  [address]   [facility] [address]    Chief Complaint   Patient presents with    Breast Problem     Pt is here for 6 month follow up        Assessment/Plan   Diagnoses and all orders for this visit:    Family history of breast cancer in female    Intraductal papilloma of breast, right    Dense breast tissue    Screening mammogram, encounter for  -     Mammo screening bilateral w 3d & cad; Future            Advance Care Planning/Advance Directives:  Did not discuss  with the patient  Oncology History:     No history exists  History of Present Illness: follow up  -Interval History:none    Review of Systems:  Review of Systems   Constitutional: Negative  Negative for appetite change and fever  Eyes: Negative  Respiratory: Negative for shortness of breath  Cardiovascular: Negative  Gastrointestinal: Negative  Endocrine: Negative  Genitourinary: Negative  Musculoskeletal: Negative  Negative for arthralgias and myalgias  Skin: Negative  Allergic/Immunologic: Negative  Neurological: Negative  Hematological: Negative  Negative for adenopathy  Does not bruise/bleed easily  Psychiatric/Behavioral: Negative          Patient Active Problem List   Diagnosis    Intraductal papilloma of breast, right    Family history of breast cancer in female    Dense breast tissue    Screening mammogram, encounter for     Past Medical History:   Diagnosis Date    Abnormal weight gain     Anxiety     Dyslipidemia     Gastric bypass status for obesity     H/O cardiac murmur     H/O hearing loss     H/O: osteoarthritis     History of postmenopausal bleeding     Hx of hypercholesterolemia     Intraductal papilloma of breast, right     Irregular heart beat     Low vitamin B12 level     Mitral valve insufficiency and aortic valve insufficiency     Morbid obesity (HCC)     Obesity     Obstructive sleep apnea     Osteoarthritis     Panic disorder     Postgastrectomy malabsorption     Prediabetes     Small bowel obstruction (HCC)     Urgency of urination     Wears glasses      Past Surgical History:   Procedure Laterality Date    ARTHROSCOPY KNEE      BREAST BIOPSY  2016    needle core    BREAST SURGERY      25 yrs ago excision of single breast lesion     SECTION      DILATION AND CURETTAGE OF UTERUS      EPIGASTRIC HERNIA REPAIR      HIATAL HERNIA REPAIR      HYSTERECTOMY      PARTIAL GASTRECTOMY      REPLACEMENT TOTAL KNEE BILATERAL      ROTATOR CUFF REPAIR      bilat    LEO-EN-Y PROCEDURE      SHOULDER SURGERY      SMALL INTESTINE SURGERY      STOMACH SURGERY      STOMACH SURGERY      revision of gastrojejunostomy    THERAPEUTIC       treatment of missed      Family History   Problem Relation Age of Onset    Breast cancer Mother     Breast cancer Maternal Grandmother      Social History     Social History    Marital status: /Civil Union     Spouse name: N/A    Number of children: N/A    Years of education: N/A     Occupational History    Not on file  Social History Main Topics    Smoking status: Never Smoker    Smokeless tobacco: Never Used    Alcohol use No    Drug use: Unknown    Sexual activity: Not on file     Other Topics Concern    Not on file     Social History Narrative    No narrative on file       Current Outpatient Prescriptions:     B Complex-C (B-COMPLEX WITH VITAMIN C) tablet, Take 1 tablet by mouth daily  , Disp: , Rfl:     bcg vaccine (THERACYS) 81 MG/VIAL, by Intravesical route, Disp: , Rfl:     Biotin 5000 MCG TABS, Take by mouth , Disp: , Rfl:     Calcium Citrate-Vitamin D 500-500 MG-UNIT PACK, Take by mouth , Disp: , Rfl:     Cholecalciferol (VITAMIN D) 2000 UNITS CAPS, Take by mouth , Disp: , Rfl:     Diclofenac Sodium ER (VOLTAREN-XR) 100 MG 24 hr tablet, Take 100 mg by mouth daily  , Disp: , Rfl:     ELIQUIS 5 MG, , Disp: , Rfl:     metoprolol succinate (TOPROL-XL) 100 mg 24 hr tablet, Take 100 mg by mouth daily  , Disp: , Rfl:     Multiple Vitamin (MULTIVITAMIN) tablet, Take 1 tablet by mouth daily  , Disp: , Rfl:     PARoxetine (PAXIL) 40 MG tablet, Take 40 mg by mouth every morning , Disp: , Rfl:     simvastatin (ZOCOR) 20 mg tablet, Take 20 mg by mouth daily at bedtime  , Disp: , Rfl:     traMADol (ULTRAM) 50 mg tablet, Take 50 mg by mouth every 6 (six) hours as needed for moderate pain , Disp: , Rfl:   No Known Allergies    The following portions of the patient's history were reviewed and updated as appropriate: allergies, current medications, past family history, past medical history, past social history, past surgical history and problem list         Vitals:    04/10/18 1028   BP: 120/80   Pulse: 98   Resp: 18   Temp: 99 1 °F (37 3 °C)       Physical Exam   Constitutional: She is oriented to person, place, and time  She appears well-developed and well-nourished  HENT:   Head: Normocephalic and atraumatic  Pulmonary/Chest: Right breast exhibits no inverted nipple, no mass, no nipple discharge, no skin change and no tenderness  Left breast exhibits no inverted nipple, no mass, no nipple discharge, no skin change and no tenderness  Lymphadenopathy:        Right axillary: No pectoral and no lateral adenopathy present  Left axillary: No pectoral and no lateral adenopathy present  Right: No supraclavicular adenopathy present  Left: No supraclavicular adenopathy present  Neurological: She is alert and oriented to person, place, and time  Psychiatric: She has a normal mood and affect           Results:      Imaging  01/29/2018 bilateral screening mammogram is benign BI-RADS two density three    I reviewed the above imaging data  Discussion/Summary:  29-year-old female with a history of breast cancer in her mother, intraductal papilloma of the right breast and dense breast tissue  There are no concerns on examination today  Her recent mammogram was benign  I will continue to follow her on an annual basis along with her gynecologist, Dr Tania Hou  I advised her to return sooner should the need arise

## 2018-11-06 ENCOUNTER — ANNUAL EXAM (OUTPATIENT)
Dept: OBGYN CLINIC | Facility: CLINIC | Age: 63
End: 2018-11-06
Payer: COMMERCIAL

## 2018-11-06 VITALS
WEIGHT: 176.8 LBS | BODY MASS INDEX: 34.71 KG/M2 | HEIGHT: 60 IN | SYSTOLIC BLOOD PRESSURE: 120 MMHG | DIASTOLIC BLOOD PRESSURE: 80 MMHG

## 2018-11-06 DIAGNOSIS — Z12.4 SCREENING FOR CERVICAL CANCER: Primary | ICD-10-CM

## 2018-11-06 DIAGNOSIS — Z11.51 SCREENING FOR HPV (HUMAN PAPILLOMAVIRUS): ICD-10-CM

## 2018-11-06 DIAGNOSIS — Z01.419 WOMEN'S ANNUAL ROUTINE GYNECOLOGICAL EXAMINATION: ICD-10-CM

## 2018-11-06 DIAGNOSIS — Z13.820 OSTEOPOROSIS SCREENING: ICD-10-CM

## 2018-11-06 PROCEDURE — 87624 HPV HI-RISK TYP POOLED RSLT: CPT | Performed by: OBSTETRICS & GYNECOLOGY

## 2018-11-06 PROCEDURE — G0145 SCR C/V CYTO,THINLAYER,RESCR: HCPCS | Performed by: OBSTETRICS & GYNECOLOGY

## 2018-11-06 PROCEDURE — 99396 PREV VISIT EST AGE 40-64: CPT | Performed by: OBSTETRICS & GYNECOLOGY

## 2018-11-06 RX ORDER — DILTIAZEM HYDROCHLORIDE 180 MG/1
CAPSULE, EXTENDED RELEASE ORAL
COMMUNITY
Start: 2018-09-24

## 2018-11-06 NOTE — PROGRESS NOTES
Assessment/Plan:   1  Yearly exam-Pap smear done with HPV testing with patient consent, self-breast awareness reviewed, calcium/vitamin-D recommendations discussed, mammogram request not given, colonoscopy as per specialist, DEXA scan given for screening after age 72  2  History of intraductal papilloma right breast/increased breast density-patient continues to follow-up with Dr Amor Wallace myself at alternating 6 exams  Breast exam is normal today  She has request for 3D mammogram as ordered by Dr Amor Wallace January 2019  She will continue follow-up with her and with me as needed  3  Family history of breast cancer-noted in patient has maternal grandmother who had bilateral breast cancer and her mother had breast cancer with bilateral mastectomy  4  Loss of height in the past/osteopenia in the forearm-patient with DEXA scan February 2014 with normal bone density of the hip and spine but osteopenia in the forearm  Calcium and vitamin-D were recommended along with weight-bearing exercise  Request for DEXA scan was given, post dated 12/07/18  5  Atrophic vagina-patient without symptoms  6  Bladder cancer-patient status post BCG treatments  She has follow-up with her urologist with cystoscopy next week  She is status post recent CT scan with results still pending  Fortunately, she is doing well  7  Other-patient with history of bowel obstruction related to prior gastric bypass  She has no further issues  Her son with Down syndrome was  last May 2018  My congratulations were given  She will follow-up in 1 year or as needed  No problem-specific Assessment & Plan notes found for this encounter         Diagnoses and all orders for this visit:    Screening for cervical cancer  -     Liquid-based pap, screening    Screening for HPV (human papillomavirus)  -     Liquid-based pap, screening    Other orders  -     CARTIA  MG 24 hr capsule;           Subjective:      Patient ID: Dylan Garrett is a 57 y o  female  Patient was seen today for yearly exam   Please see assessment plan for details  The following portions of the patient's history were reviewed and updated as appropriate: allergies, current medications, past family history, past medical history, past social history, past surgical history and problem list     Review of Systems   Constitutional: Negative for chills, diaphoresis, fatigue and fever  Respiratory: Negative for apnea, cough, chest tightness, shortness of breath and wheezing  Cardiovascular: Negative for chest pain, palpitations and leg swelling  Gastrointestinal: Negative for abdominal distention, abdominal pain, anal bleeding, constipation, diarrhea, nausea, rectal pain and vomiting  Genitourinary: Negative for difficulty urinating, dyspareunia, dysuria, frequency, hematuria, menstrual problem, pelvic pain, urgency, vaginal bleeding, vaginal discharge and vaginal pain  Musculoskeletal: Negative for arthralgias, back pain and myalgias  Skin: Negative for color change and rash  Neurological: Negative for dizziness, syncope, light-headedness, numbness and headaches  Hematological: Negative for adenopathy  Does not bruise/bleed easily  Psychiatric/Behavioral: Negative for dysphoric mood and sleep disturbance  The patient is not nervous/anxious            Objective:      /80 (BP Location: Left arm, Patient Position: Sitting, Cuff Size: Standard)   Ht 5' (1 524 m)   Wt 80 2 kg (176 lb 12 8 oz)   BMI 34 53 kg/m²          Physical Exam    Objective      /80 (BP Location: Left arm, Patient Position: Sitting, Cuff Size: Standard)   Ht 5' (1 524 m)   Wt 80 2 kg (176 lb 12 8 oz)   BMI 34 53 kg/m²     General:   alert and oriented, in no acute distress, alert, appears stated age and cooperative   Neck: normal to inspection and palpation   Breast: normal appearance, no masses or tenderness   Heart:    Lungs:    Abdomen: soft, non-tender, without masses or organomegaly   Vulva: normal   Vagina: Mildly atrophic, without erythema or lesions or discharge  Cervix: Mildly atrophic, without lesions or discharge or cervicitis    No Cervical motion tenderness   Uterus: top normal size, anteverted, non-tender   Adnexa: no mass, fullness, tenderness   Rectum: negative

## 2018-11-12 LAB
HPV HR 12 DNA CVX QL NAA+PROBE: NEGATIVE
HPV16 DNA CVX QL NAA+PROBE: NEGATIVE
HPV18 DNA CVX QL NAA+PROBE: NEGATIVE

## 2018-11-16 LAB
LAB AP GYN PRIMARY INTERPRETATION: NORMAL
Lab: NORMAL

## 2018-11-19 ENCOUNTER — TELEPHONE (OUTPATIENT)
Dept: OBGYN CLINIC | Facility: CLINIC | Age: 63
End: 2018-11-19

## 2019-01-08 DIAGNOSIS — Z78.0 POST-MENOPAUSAL: Primary | ICD-10-CM

## 2019-01-30 ENCOUNTER — HOSPITAL ENCOUNTER (OUTPATIENT)
Dept: MAMMOGRAPHY | Facility: MEDICAL CENTER | Age: 64
Discharge: HOME | End: 2019-01-30
Payer: MEDICARE

## 2019-01-30 VITALS — HEIGHT: 60 IN | WEIGHT: 176 LBS | BODY MASS INDEX: 34.55 KG/M2

## 2019-01-30 DIAGNOSIS — Z12.31 SCREENING MAMMOGRAM, ENCOUNTER FOR: ICD-10-CM

## 2019-01-30 PROCEDURE — 77067 SCR MAMMO BI INCL CAD: CPT

## 2019-01-30 PROCEDURE — 77063 BREAST TOMOSYNTHESIS BI: CPT

## 2019-04-10 ENCOUNTER — OFFICE VISIT (OUTPATIENT)
Dept: SURGICAL ONCOLOGY | Facility: CLINIC | Age: 64
End: 2019-04-10
Payer: MEDICARE

## 2019-04-10 VITALS
SYSTOLIC BLOOD PRESSURE: 120 MMHG | RESPIRATION RATE: 14 BRPM | DIASTOLIC BLOOD PRESSURE: 72 MMHG | HEIGHT: 60 IN | WEIGHT: 177.8 LBS | BODY MASS INDEX: 34.91 KG/M2 | TEMPERATURE: 100.2 F | HEART RATE: 74 BPM

## 2019-04-10 DIAGNOSIS — Z12.39 BREAST CANCER SCREENING OTHER THAN MAMMOGRAM: ICD-10-CM

## 2019-04-10 DIAGNOSIS — R92.2 DENSE BREAST TISSUE: Primary | ICD-10-CM

## 2019-04-10 DIAGNOSIS — Z12.31 SCREENING MAMMOGRAM, ENCOUNTER FOR: ICD-10-CM

## 2019-04-10 DIAGNOSIS — Z80.3 FAMILY HISTORY OF BREAST CANCER IN FEMALE: ICD-10-CM

## 2019-04-10 PROCEDURE — 99213 OFFICE O/P EST LOW 20 MIN: CPT | Performed by: SURGERY

## 2019-07-31 ENCOUNTER — HOSPITAL ENCOUNTER (OUTPATIENT)
Dept: ULTRASOUND IMAGING | Facility: CLINIC | Age: 64
Discharge: HOME | End: 2019-07-31
Payer: MEDICARE

## 2019-07-31 VITALS — HEIGHT: 60 IN | BODY MASS INDEX: 34.75 KG/M2 | WEIGHT: 177 LBS

## 2019-07-31 DIAGNOSIS — R92.2 DENSE BREAST TISSUE: ICD-10-CM

## 2019-07-31 DIAGNOSIS — Z12.39 BREAST CANCER SCREENING OTHER THAN MAMMOGRAM: ICD-10-CM

## 2019-07-31 PROCEDURE — 76377 3D RENDER W/INTRP POSTPROCES: CPT

## 2019-07-31 PROCEDURE — 76641 ULTRASOUND BREAST COMPLETE: CPT

## 2019-12-30 ENCOUNTER — ANNUAL EXAM (OUTPATIENT)
Dept: OBGYN CLINIC | Facility: CLINIC | Age: 64
End: 2019-12-30
Payer: MEDICARE

## 2019-12-30 VITALS
HEIGHT: 60 IN | BODY MASS INDEX: 35.53 KG/M2 | WEIGHT: 181 LBS | SYSTOLIC BLOOD PRESSURE: 130 MMHG | DIASTOLIC BLOOD PRESSURE: 74 MMHG

## 2019-12-30 DIAGNOSIS — Z01.419 WOMEN'S ANNUAL ROUTINE GYNECOLOGICAL EXAMINATION: ICD-10-CM

## 2019-12-30 DIAGNOSIS — M81.0 OSTEOPOROSIS OF FOREARM: ICD-10-CM

## 2019-12-30 DIAGNOSIS — R92.2 DENSE BREAST TISSUE: ICD-10-CM

## 2019-12-30 DIAGNOSIS — Z80.3 FAMILY HISTORY OF BREAST CANCER IN FEMALE: ICD-10-CM

## 2019-12-30 DIAGNOSIS — Z12.31 VISIT FOR SCREENING MAMMOGRAM: Primary | ICD-10-CM

## 2019-12-30 DIAGNOSIS — N95.2 VAGINAL ATROPHY: ICD-10-CM

## 2019-12-30 DIAGNOSIS — R39.15 URINARY URGENCY: ICD-10-CM

## 2019-12-30 PROCEDURE — G0101 CA SCREEN;PELVIC/BREAST EXAM: HCPCS | Performed by: OBSTETRICS & GYNECOLOGY

## 2019-12-30 NOTE — PROGRESS NOTES
Assessment/Plan   Diagnoses and all orders for this visit:    Visit for screening mammogram  -     Mammo screening bilateral w 3d & cad; Future    Vaginal atrophy    Dense breast tissue    Family history of breast cancer in female    Urinary urgency    Women's annual routine gynecological examination    Osteoporosis of forearm    Other orders  -     apixaban (ELIQUIS) 5 mg; Take 5 mg by mouth 2 (two) times a day     1  Yearly exam-Pap smear deferred, self-breast awareness reviewed, calcium/vitamin-D recommendations discussed, mammogram request given, colonoscopy as per specialist   2  History of intraductal papilloma right breast/increased breast density-patient continues with breast exam at 6 month intervals alternating with Dr Khanh Francisco and myself  Breast exam was unremarkable  Request for mammogram was given for January 2020  She had ABUS 7/31/19 which was unremarkable  Rachel Rivers risk was 34 7 at most recent mammogram   3  Family history breast cancer-noted in maternal grandmother with bilateral breast cancer and mother had breast cancer with bilateral mastectomy  She will follow-up with Dr Khanh Francisco as recommended  4  Osteoporosis in the forearm/prior loss of height-again, recommended calcium, vitamin-D, and weight-bearing exercise  Most recent DEXA scan with osteoporosis in the forearm but normal density in the spine and hip, with 3 6% decrease and 9 present decreased respectively in those 2 areas  She was counseled about this  She will continue upper extremity exercises and may consider yoga  Strongly encouraged to be careful with any falls as she would be at risk for arm fracture  Consider repeat DEXA scan January 2021    5  Vaginal atrophy-patient doing well without issues  6  History of bladder cancer-status post BCG treatments, now with rare treatment at every 6 months or so  She continues to follow-up with [facility]    She did have recent episode of bleeding after biopsy requiring hospitalization and ultimately cauterization of the bladder area  She is doing well now  7  Prior history of bowel obstruction related to gastric bypass-doing well  8  Other-son with Down syndrome  May 2018  To follow-up 1 year for yearly exam or as needed  Subjective   Patient ID: Jackie Sierra is a 75 y o  female  Vitals:    19 1102   BP: 130/74     Patient was seen today for yearly exam   Please see assessment plan for details        The following portions of the patient's history were reviewed and updated as appropriate: allergies, current medications, past family history, past medical history, past social history, past surgical history and problem list   Past Medical History:   Diagnosis Date    Abnormal weight gain     Anxiety     Bladder cancer ([address] ) 2017    Dyslipidemia     Gastric bypass status for obesity     H/O cardiac murmur     H/O hearing loss     H/O: osteoarthritis     History of postmenopausal bleeding     Hx of hypercholesterolemia     Intraductal papilloma of breast, right     Irregular heart beat     Low vitamin B12 level     Mitral valve insufficiency and aortic valve insufficiency     Morbid obesity (HCC)     Obesity     Obstructive sleep apnea     Osteoarthritis     Panic disorder     Postgastrectomy malabsorption     Prediabetes     Recurrent pregnancy loss, antepartum condition or complication     Small bowel obstruction ([address] )     Urgency of urination     Wears glasses      Past Surgical History:   Procedure Laterality Date    ARTHROSCOPY KNEE      BREAST BIOPSY Right 2016    BREAST BIOPSY Left 2008    BREAST EXCISIONAL BIOPSY Right     BREAST SURGERY      25 yrs ago excision of single breast lesion     SECTION      DILATION AND CURETTAGE OF UTERUS      EPIGASTRIC HERNIA REPAIR      HIATAL HERNIA REPAIR      PARTIAL GASTRECTOMY      REPLACEMENT TOTAL KNEE BILATERAL      ROTATOR CUFF REPAIR      bilat    LEO-EN-Y PROCEDURE      SHOULDER SURGERY      SMALL INTESTINE SURGERY      STOMACH SURGERY      STOMACH SURGERY      revision of gastrojejunostomy    THERAPEUTIC       treatment of missed     US GUIDED BREAST BIOPSY RIGHT COMPLETE Right 2016     OB History    Para Term  AB Living   2 1 1     1   SAB TAB Ectopic Multiple Live Births           1      # Outcome Date GA Lbr Kapil/2nd Weight Sex Delivery Anes PTL Lv   2             1 Term               Obstetric Comments   Menarche 6   Age first pregnancy 42   Birth control       Current Outpatient Medications:     apixaban (ELIQUIS) 5 mg, Take 5 mg by mouth 2 (two) times a day, Disp: , Rfl:     B Complex-C (B-COMPLEX WITH VITAMIN C) tablet, Take 1 tablet by mouth daily  , Disp: , Rfl:     bcg vaccine (THERACYS) 81 MG/VIAL, by Intravesical route, Disp: , Rfl:     Biotin 5000 MCG TABS, Take by mouth , Disp: , Rfl:     CARTIA  MG 24 hr capsule, , Disp: , Rfl:     Cholecalciferol (VITAMIN D) 2000 UNITS CAPS, Take by mouth , Disp: , Rfl:     Diclofenac Sodium ER (VOLTAREN-XR) 100 MG 24 hr tablet, Take 100 mg by mouth daily  , Disp: , Rfl:     ELIQUIS 5 MG, , Disp: , Rfl:     Ferrous Sulfate Dried 200 (65 Fe) MG TABS, Take 325 mg by mouth, Disp: , Rfl:     metoprolol succinate (TOPROL-XL) 100 mg 24 hr tablet, Take 100 mg by mouth daily  , Disp: , Rfl:     Multiple Vitamin (MULTIVITAMIN) tablet, Take 1 tablet by mouth daily  , Disp: , Rfl:     PARoxetine (PAXIL) 40 MG tablet, Take 40 mg by mouth every morning , Disp: , Rfl:     simvastatin (ZOCOR) 20 mg tablet, Take 20 mg by mouth daily at bedtime  , Disp: , Rfl:   No Known Allergies  Social History     Socioeconomic History    Marital status: /Civil Union     Spouse name: None    Number of children: None    Years of education: None    Highest education level: None   Occupational History    None   Social Needs    Financial resource strain: None   [employer] insecurity:     Worry: None     Inability: None    Transportation needs:     Medical: None     Non-medical: None   Tobacco Use    Smoking status: Never Smoker    Smokeless tobacco: Never Used   Substance and Sexual Activity    Alcohol use: No    Drug use: Never    Sexual activity: Not Currently   Lifestyle    Physical activity:     Days per week: None     Minutes per session: None    Stress: None   Relationships    Social connections:     Talks on phone: None     Gets together: None     Attends [employer] service: None     Active member of club or organization: None     Attends meetings of clubs or organizations: None     Relationship status: None    Intimate partner violence:     Fear of current or ex partner: None     Emotionally abused: None     Physically abused: None     Forced sexual activity: None   Other Topics Concern    None   Social History Narrative    None     Family History   Problem Relation Age of Onset    Breast cancer Mother 76    Breast cancer Maternal Grandmother 48    No Known Problems Father     No Known Problems Sister     No Known Problems Maternal Grandfather     No Known Problems Paternal Grandmother     No Known Problems Paternal Grandfather     No Known Problems Sister     No Known Problems Paternal Aunt        Review of Systems    Objective   Physical Exam    Objective      /74 (BP Location: Left arm, Patient Position: Sitting, Cuff Size: Standard)   Ht 5' (1 524 m)   Wt 82 1 kg (181 lb)   BMI 35 35 kg/m²     General:   alert and oriented, in no acute distress   Neck: normal to inspection and palpation   Breast: normal appearance, no masses or tenderness   Heart:    Lungs:    Abdomen: soft, non-tender, without masses or organomegaly   Vulva: normal   Vagina: Without erythema or lesions or discharge  Mild to moderate atrophy   Cervix: Without lesions or discharge or cervicitis    No Cervical motion tenderness, mildly atrophic   Uterus: top normal size, anteverted, non-tender   Adnexa: no mass, fullness, tenderness   Rectum: negative    Psych:  Normal mood and affect   Skin:  Without obvious lesions   Eyes: symmetric, with normal movements and reactivity   Musculoskeletal:  Normal muscle tone and movements appreciated

## 2019-12-30 NOTE — PATIENT INSTRUCTIONS
Vaginal Atrophy   WHAT YOU NEED TO KNOW:   What is vaginal atrophy? Vaginal atrophy is a condition that causes thinning, drying, and inflammation of vaginal tissue  This condition is caused by decreased levels of estrogen (a female sex hormone)  Vaginal atrophy can increase your risk for vaginal and urinary tract infections  Vaginal atrophy can worsen over time if not treated  What causes or increases your risk of vaginal atrophy? · Menopause     · Medicines that lower your estrogen levels, such as those used to treat breast cancer, endometriosis, or fibroids    · Radiation to your pelvic area     · Surgery to remove the ovaries    · Breastfeeding  What are the signs and symptoms of vaginal atrophy? · Vaginal dryness, itching, and burning    · Vaginal discharge    · Pain or discomfort during sex    · Light bleeding after sex    · Burning during urination    · Frequent, sudden, strong urges to urinate    · Urinary incontinence (loss of control of your bladder)  How is vaginal atrophy diagnosed? Your healthcare provider will ask about your symptoms  A pelvic exam will be done to examine your vagina and cervix  Your healthcare provider will place a speculum into your vagina to open and examine it  A sample of discharge from your vagina may be collected and tested  A urine test may also be done  How is vaginal atrophy treated? · Over-the counter vaginal moisturizers  can help reduce dryness  Your healthcare provider may recommend that you use a vaginal moisturizer several times each week and during sex  Only use creams that are made for vaginal use  Do  not  use petroleum jelly  Lubricants can be used during sex to decrease pain and discomfort  · Estrogen  may help decrease dryness  It may also lower your risk of vaginal infections if you are going through menopause  It can also help to relieve urinary symptoms  Estrogen may be prescribed in the form of a cream, tablet, or ring   These medicines can be applied or inserted into the vagina  Estrogen can also be prescribed in the form of a pill  When should I contact my healthcare provider? · You have a foul-smelling odor coming from your vagina  · You have a thick, cheese-like discharge from your vagina  · You have itching, swelling, or redness in your vagina  · You have pain or burning when you urinate  · Your urine smells bad  · Your symptoms do not improve, or they get worse  · You have questions or concerns about your condition or care  CARE AGREEMENT:   You have the right to help plan your care  Learn about your health condition and how it may be treated  Discuss treatment options with your caregivers to decide what care you want to receive  You always have the right to refuse treatment  The above information is an  only  It is not intended as medical advice for individual conditions or treatments  Talk to your doctor, nurse or pharmacist before following any medical regimen to see if it is safe and effective for you  © 2017 [employer] Information is for End User's use only and may not be sold, redistributed or otherwise used for commercial purposes  All illustrations and images included in CareNotes® are the copyrighted property of A [employer] M [employer]  or Kalpesh Renae  Osteoporosis   WHAT YOU NEED TO KNOW:   What is osteoporosis? Osteoporosis is a long-term medical condition that causes your bones to become weak, brittle, and more likely to fracture  Osteoporosis occurs when your body absorbs more bone than it makes  It is also caused by a lack of calcium and estrogen (female hormone)  What increases my risk for osteoporosis?    · Age older than 28    · Low estrogen levels    · Female gender    · Alcohol, tobacco, or caffeine    · Lack of calcium and vitamin D in your foods    · Lack of exercise    · Some illnesses, such as thyroid diseases, bone cancer, and long-term lung diseases    · Certain medicines, such as steroids, anticonvulsants, and blood-thinners  What are the signs and symptoms of osteoporosis? You may not have any signs or symptoms  You may break a bone after a muscle strain, bump, or fall  A break usually occurs in the hip, spine, or wrist  A collapsed vertebra (bone in your spine) may cause severe back pain or loss of height from bent posture  How is osteoporosis diagnosed? · Blood and urine tests  measure your calcium, vitamin D, and estrogen levels  · An x-ray or CT  may show thinned bones or a fracture  You may be given contrast liquid to help the bones show up better in the pictures  Tell the healthcare provider if you have ever had an allergic reaction to contrast liquid  Do not enter the MRI room with anything metal  Metal can cause serious injury  Tell the healthcare provider if you have any metal in or on your body  · A bone density test  compares your bone thickness with what is expected for someone of your age, gender, and ethnicity  How is osteoporosis treated? Medicines may be given to prevent bone loss, build new bone, and increase estrogen  These medicines help prevent fractures and may be given as a pill or injection  Ask your healthcare provider for more information on these medicines  How can I help prevent bone loss? · Eat healthy foods that are high in calcium  This helps keep your bones strong  Good sources of calcium are milk, cheese, broccoli, tofu, almonds, and canned salmon and sardines  · Increase your vitamin D intake  Vitamin D is in fish oils, some vegetables, and fortified milk, cereal, and bread  Vitamin D is also formed in the skin when it is exposed to the sun  Ask your healthcare provider how much sunlight is safe for you  · Drink liquids as directed  Ask your healthcare provider how much liquid to drink each day and which liquids are best for you  Do not have alcohol or caffeine   They decrease bone mineral density, which can weaken your bones  · Exercise regularly  Ask your healthcare provider about the best exercise plan for you  Weight bearing exercise for 30 minutes, 3 times a week can help build and strengthen bone  · Do not smoke  Nicotine and other chemicals in cigarettes and cigars can cause lung damage  Ask your healthcare provider for information if you currently smoke and need help to quit  E-cigarettes or smokeless tobacco still contain nicotine  Talk to your healthcare provider before you use these products  · Go to physical therapy as directed  A physical therapist teaches you exercises to help improve movement and muscle strength  When should I seek immediate care? · You have severe pain  When should I contact my healthcare provider? · You have increasing pain after a fall  · You have pain when you do your daily activities  · You have questions or concerns about your condition or care  CARE AGREEMENT:   You have the right to help plan your care  Learn about your health condition and how it may be treated  Discuss treatment options with your caregivers to decide what care you want to receive  You always have the right to refuse treatment  The above information is an  only  It is not intended as medical advice for individual conditions or treatments  Talk to your doctor, nurse or pharmacist before following any medical regimen to see if it is safe and effective for you  © 2017 [facility] [facility] Information is for End User's use only and may not be sold, redistributed or otherwise used for commercial purposes  All illustrations and images included in CareNotes® are the copyrighted property of A D A M [employer] Inc  or Kalpesh Renae

## 2020-01-31 ENCOUNTER — HOSPITAL ENCOUNTER (OUTPATIENT)
Dept: MAMMOGRAPHY | Facility: MEDICAL CENTER | Age: 65
Discharge: HOME | End: 2020-01-31
Payer: MEDICARE

## 2020-01-31 VITALS — BODY MASS INDEX: 35.53 KG/M2 | HEIGHT: 60 IN | WEIGHT: 181 LBS

## 2020-01-31 DIAGNOSIS — Z12.31 VISIT FOR SCREENING MAMMOGRAM: ICD-10-CM

## 2020-01-31 PROCEDURE — 77063 BREAST TOMOSYNTHESIS BI: CPT

## 2020-01-31 PROCEDURE — 77067 SCR MAMMO BI INCL CAD: CPT

## 2020-02-07 ENCOUNTER — TELEPHONE (OUTPATIENT)
Dept: BARIATRICS | Facility: CLINIC | Age: 65
End: 2020-02-07

## 2020-02-07 NOTE — TELEPHONE ENCOUNTER
called patient to schedule a 5 year f/u appointment - patient stated that last time she came in' it was not worth it as nothing was reviewed'  She also stated that it 'was a waste of time'  ----- Message from Karen Howe RN sent at 2020 11:18 AM EST -----  Regardin year f/u appointment  Please contact patient to schedule a 5 year follow up appointment  Thank You

## 2020-04-08 ENCOUNTER — TELEMEDICINE (OUTPATIENT)
Dept: SURGICAL ONCOLOGY | Facility: CLINIC | Age: 65
End: 2020-04-08
Payer: MEDICARE

## 2020-04-08 DIAGNOSIS — R92.2 DENSE BREAST TISSUE: Primary | ICD-10-CM

## 2020-04-08 DIAGNOSIS — D24.1 INTRADUCTAL PAPILLOMA OF RIGHT BREAST: ICD-10-CM

## 2020-04-08 DIAGNOSIS — Z12.39 BREAST CANCER SCREENING OTHER THAN MAMMOGRAM: ICD-10-CM

## 2020-04-08 DIAGNOSIS — Z80.3 FAMILY HISTORY OF BREAST CANCER IN FEMALE: ICD-10-CM

## 2020-04-08 PROCEDURE — 99442 PR PHYS/QHP TELEPHONE EVALUATION 11-20 MIN: CPT | Performed by: SURGERY

## 2021-01-18 DIAGNOSIS — Z23 ENCOUNTER FOR IMMUNIZATION: ICD-10-CM

## 2021-03-05 ENCOUNTER — TELEPHONE (OUTPATIENT)
Dept: OBGYN CLINIC | Facility: CLINIC | Age: 66
End: 2021-03-05

## 2021-03-05 DIAGNOSIS — Z12.31 SCREENING MAMMOGRAM, ENCOUNTER FOR: Primary | ICD-10-CM

## 2021-03-24 ENCOUNTER — HOSPITAL ENCOUNTER (OUTPATIENT)
Dept: MAMMOGRAPHY | Facility: MEDICAL CENTER | Age: 66
Discharge: HOME | End: 2021-03-24
Payer: MEDICARE

## 2021-03-24 VITALS — HEIGHT: 60 IN | WEIGHT: 181 LBS | BODY MASS INDEX: 35.53 KG/M2

## 2021-03-24 DIAGNOSIS — Z12.31 SCREENING MAMMOGRAM, ENCOUNTER FOR: ICD-10-CM

## 2021-03-24 PROCEDURE — 77063 BREAST TOMOSYNTHESIS BI: CPT

## 2021-03-24 PROCEDURE — 77067 SCR MAMMO BI INCL CAD: CPT

## 2021-10-11 NOTE — PATIENT INSTRUCTIONS
Osteopenia   WHAT YOU NEED TO KNOW:   What is osteopenia? Osteopenia is a condition that causes bone loss and low bone mineral density (BMD)  Low BMD can weaken your bones and increase your risk for fractures  Osteopenia does not cause signs or symptoms  You may not know you have it until you break a bone  Osteopenia must be managed or treated so it does not worsen and become osteoporosis  Osteoporosis is a serious condition that causes bones to become weak, brittle, and easily fractured  What increases my risk for osteopenia? Your risk increases as you age and lose bone  The following may also increase your risk:  · Lack of weight-bearing exercise, or being bedridden    · Loss of testosterone (in men), or loss of estrogen, such as from menopause (in women)    · Family history of osteopenia or osteoporosis    · Alcohol abuse or smoking cigarettes    · Long-term use of a steroid medicine or an anticonvulsant medicine    · An eating disorder, such as anorexia    · Not enough calcium and vitamin D  How is osteopenia diagnosed and treated? · A bone scan  may be used to measure your bone density (thickness) and bone mass (amount)  You may need a bone scan if you are older than 65 years or you are in menopause  A bone scan may be used if you are younger than 72 years and at increased risk for fractures  A dual-energy x-ray absorptiometry (DXA) is a type of bone scan that measures the mineral content in your spine, hip, or forearm  DXA will give a number, called a T-score, based on how much bone mineral you have  The T-score shows your risk for fracture  · Treatment  depends on your risk for fracture  If your risk is low, you may only need to make exercise, nutrition, and other lifestyle changes to manage osteopenia  The changes can help prevent more bone mineral loss and reduce your risk for fractures   If your risk is high, you may be given medicines to help strengthen your bones, prevent bone breakdown, or increase bone formation  What can I do to manage or prevent osteopenia? · Exercise as directed  Do weight-bearing exercises, such as brisk walking, dancing, or yoga  Weight-bearing exercises help build or maintain bone  Exercises such as swimming and bike riding are non-weight-bearing and will not build or maintain bone  Weightlifting also helps strengthen bones and build muscle  Extra muscle can help protect your bones  Your healthcare provider may recommend weightlifting 3 times per week as part of your exercise routine  · Increase calcium and vitamin D as directed  Calcium and vitamin D work together to help build bone  The body deposits calcium into the bones until about age 27  You will then need to get enough calcium and vitamin D to maintain what your bones are storing  Your healthcare provider may tell you to eat more dairy products, such as milk and cheese, for calcium  Spinach, salmon, and dried beans are also good sources of calcium  Cereal, bread, and orange juice may be fortified with vitamin D  You also get vitamin D from exposure to sunlight  Your healthcare provider may also suggest a calcium or vitamin D supplement  Do not take supplements unless directed  · Limit or do not drink alcohol as directed  Alcohol can take calcium from your bones and increase your risk for fractures  Ask your healthcare provider if it is safe for you to drink alcohol  Women should limit alcohol to 1 drink per day  Men should limit alcohol to 2 drinks per day  A drink of alcohol is 12 ounces of beer, 5 ounces of wine, or 1½ ounces of liquor  · Do not smoke  Nicotine can damage blood vessels and make it more difficult to manage your osteopenia  Smoking also affects bone density and increases your risk for bone fractures  Do not use e-cigarettes or smokeless tobacco in place of cigarettes or to help you quit  They still contain nicotine   Ask your healthcare provider for information if you currently smoke and need help quitting  Ask your healthcare provider for information if you need help quitting  · Prevent falls  Decrease your risk for falling by removing items from the floor and removing loose carpets  Turn the lights on where you will be walking  CARE AGREEMENT:   You have the right to help plan your care  Learn about your health condition and how it may be treated  Discuss treatment options with your caregivers to decide what care you want to receive  You always have the right to refuse treatment  The above information is an  only  It is not intended as medical advice for individual conditions or treatments  Talk to your doctor, nurse or pharmacist before following any medical regimen to see if it is safe and effective for you  © 2017 [facility] Information is for End User's use only and may not be sold, redistributed or otherwise used for commercial purposes  All illustrations and images included in CareNotes® are the copyrighted property of A D A M , Inc  or Kalpesh Renae  no

## 2022-01-03 ENCOUNTER — ANNUAL EXAM (OUTPATIENT)
Dept: OBGYN CLINIC | Facility: CLINIC | Age: 67
End: 2022-01-03
Payer: MEDICARE

## 2022-01-03 VITALS
SYSTOLIC BLOOD PRESSURE: 126 MMHG | BODY MASS INDEX: 36.89 KG/M2 | DIASTOLIC BLOOD PRESSURE: 78 MMHG | WEIGHT: 183 LBS | HEIGHT: 59 IN

## 2022-01-03 DIAGNOSIS — Z12.31 SCREENING MAMMOGRAM, ENCOUNTER FOR: ICD-10-CM

## 2022-01-03 DIAGNOSIS — N95.2 VAGINAL ATROPHY: ICD-10-CM

## 2022-01-03 DIAGNOSIS — Z01.419 WOMEN'S ANNUAL ROUTINE GYNECOLOGICAL EXAMINATION: Primary | ICD-10-CM

## 2022-01-03 DIAGNOSIS — M81.0 OSTEOPOROSIS OF FOREARM: ICD-10-CM

## 2022-01-03 PROCEDURE — G0101 CA SCREEN;PELVIC/BREAST EXAM: HCPCS | Performed by: OBSTETRICS & GYNECOLOGY

## 2022-01-03 PROCEDURE — G0143 SCR C/V CYTO,THINLAYER,RESCR: HCPCS | Performed by: OBSTETRICS & GYNECOLOGY

## 2022-01-03 RX ORDER — TRAMADOL HYDROCHLORIDE 50 MG/1
TABLET ORAL
COMMUNITY
Start: 2021-12-08

## 2022-01-03 RX ORDER — ROSUVASTATIN CALCIUM 5 MG/1
TABLET, COATED ORAL
COMMUNITY
Start: 2021-11-12

## 2022-01-03 RX ORDER — FAMOTIDINE 20 MG/1
TABLET, FILM COATED ORAL
COMMUNITY
Start: 2021-11-21

## 2022-01-03 NOTE — PROGRESS NOTES
Assessment/Plan   Diagnoses and all orders for this visit:    Women's annual routine gynecological examination    Screening mammogram, encounter for  -     Mammo screening bilateral w 3d & cad; Future    Vaginal atrophy    Osteoporosis of forearm  -     DXA bone density spine hip and pelvis; Future    Other orders  -     diclofenac sodium (VOLTAREN) 50 mg EC tablet  -     famotidine (PEPCID) 20 mg tablet  -     rosuvastatin (CRESTOR) 5 mg tablet  -     traMADol (ULTRAM) 50 mg tablet     1  Yearly exam-Pap smear done with reflex HPV, self-breast awareness reviewed, calcium/vitamin-D recommendations discussed, mammogram request given, colonoscopy as per specialist   Patient thinks near 5 year selena, suggested  to arrange  2  History of intraductal papilloma right breast/increased breast density-most recent mammogram 3/24/21 normal, with Hildemalikd Ramichis risk of 20 9%  She last had virtual visit with doctor Mayuri Stack March 2020  Would suggest follow-up with her as recommended  Breast exam is normal today  3  Family history breast cancer-mother with breast cancer, status post bilateral mastectomy  Maternal grandmother had bilateral breast cancer  To follow-up with doctor Mayuri Stack  Genetics were previously recommended  4  Osteoporosis forearm-DEXA scan from 8/25/21 at [facility] demonstrates normal density in the spine/hip/femoral neck, with decreased density 7 3% in the femoral neck since 2019 study  Forearm continues with osteoporosis, T-score minus 3 2  Again precautions were reviewed  Family doctor recommend calcium, vitamin-D, and weight-bearing exercise  Could consider upper arm strengthening given the osteoporosis in this location  Was counseled previously by medications, defers  Recommend follow-up DEXA scan August 2023    5  Vaginal atrophy-uses lubrication with good results  Vaginal lubrication/moisturizer sheet given, to use accordingly    6  History of bladder cancer-status post BCG treatments  Continues to follow-up with urologist as recommended by them  7  History of gastric bypass/prior history of bowel obstruction-doing well  8  Other-son with Down syndrome,  May 2018  He is doing well and working  My congratulations were given  Follow-up 1-2 year for yearly exam or as needed  Subjective   Patient ID: Valeria Ha is a 74 y o  female  Vitals:    22 1249   BP: 126/78     Patient was seen today for yearly exam   Please see assessment plan for details        The following portions of the patient's history were reviewed and updated as appropriate: allergies, current medications, past family history, past medical history, past social history, past surgical history and problem list   Past Medical History:   Diagnosis Date    Abnormal weight gain     Anxiety     Bladder cancer ([facility] [address] ) 2017    Dyslipidemia     Gastric bypass status for obesity     H/O cardiac murmur     H/O hearing loss     H/O: osteoarthritis     History of postmenopausal bleeding     Hx of hypercholesterolemia     Intraductal papilloma of breast, right     Irregular heart beat     Low vitamin B12 level     Mitral valve insufficiency and aortic valve insufficiency     Morbid obesity (HCC)     Obesity     Obstructive sleep apnea     Osteoarthritis     Panic disorder     Postgastrectomy malabsorption     Prediabetes     Recurrent pregnancy loss, antepartum condition or complication     Small bowel obstruction ([address] )     Urgency of urination     Wears glasses      Past Surgical History:   Procedure Laterality Date    ARTHROSCOPY KNEE      BREAST BIOPSY Right 2016    BREAST BIOPSY Left 2008    BREAST EXCISIONAL BIOPSY Right     BREAST SURGERY      25 yrs ago excision of single breast lesion     SECTION      DILATION AND CURETTAGE OF UTERUS      EPIGASTRIC HERNIA REPAIR      HIATAL HERNIA REPAIR      PARTIAL GASTRECTOMY      REPLACEMENT TOTAL KNEE BILATERAL      ROTATOR CUFF REPAIR      bilat    LEO-EN-Y PROCEDURE      SHOULDER SURGERY      SMALL INTESTINE SURGERY      STOMACH SURGERY      STOMACH SURGERY      revision of gastrojejunostomy    THERAPEUTIC       treatment of missed     US GUIDED BREAST BIOPSY RIGHT COMPLETE Right 2016     OB History    Para Term  AB Living   2 1 1     1   SAB IAB Ectopic Multiple Live Births           1      # Outcome Date GA Lbr Kapil/2nd Weight Sex Delivery Anes PTL Lv   2             1 Term               Obstetric Comments   Menarche 6   Age first pregnancy 42   Birth control       Current Outpatient Medications:     B Complex-C (B-COMPLEX WITH VITAMIN C) tablet, Take 1 tablet by mouth daily  , Disp: , Rfl:     Biotin 5000 MCG TABS, Take by mouth , Disp: , Rfl:     CARTIA  MG 24 hr capsule, , Disp: , Rfl:     Cholecalciferol (VITAMIN D) 2000 UNITS CAPS, Take by mouth , Disp: , Rfl:     diclofenac sodium (VOLTAREN) 50 mg EC tablet, , Disp: , Rfl:     Diclofenac Sodium ER (VOLTAREN-XR) 100 MG 24 hr tablet, Take 100 mg by mouth daily  , Disp: , Rfl:     ELIQUIS 5 MG, , Disp: , Rfl:     famotidine (PEPCID) 20 mg tablet, , Disp: , Rfl:     Ferrous Sulfate Dried 200 (65 Fe) MG TABS, Take 325 mg by mouth, Disp: , Rfl:     metoprolol succinate (TOPROL-XL) 100 mg 24 hr tablet, Take 100 mg by mouth daily  , Disp: , Rfl:     Multiple Vitamin (MULTIVITAMIN) tablet, Take 1 tablet by mouth daily  , Disp: , Rfl:     PARoxetine (PAXIL) 40 MG tablet, Take 40 mg by mouth every morning , Disp: , Rfl:     rosuvastatin (CRESTOR) 5 mg tablet, , Disp: , Rfl:     traMADol (ULTRAM) 50 mg tablet, , Disp: , Rfl:     apixaban (ELIQUIS) 5 mg, Take 5 mg by mouth 2 (two) times a day, Disp: , Rfl:     bcg vaccine (THERACYS) 81 MG/VIAL, by Intravesical route (Patient not taking: Reported on 1/3/2022 ), Disp: , Rfl:     simvastatin (ZOCOR) 20 mg tablet, Take 20 mg by mouth daily at bedtime   (Patient not taking: Reported on 1/3/2022 ), Disp: , Rfl:   No Known Allergies  Social History     Socioeconomic History    Marital status: /Civil Union     Spouse name: None    Number of children: None    Years of education: None    Highest education level: None   Occupational History    None   Tobacco Use    Smoking status: Never Smoker    Smokeless tobacco: Never Used   Vaping Use    Vaping Use: Never used   Substance and Sexual Activity    Alcohol use: Yes    Drug use: Never    Sexual activity: Yes     Partners: Male   Other Topics Concern    None   Social History Narrative    None     Social Determinants of Health     Financial Resource Strain: Not on file   Food Insecurity: Not on file   Transportation Needs: Not on file   Physical Activity: Not on file   Stress: Not on file   Social Connections: Not on file   Intimate Partner Violence: Not on file   Housing Stability: Not on file     Family History   Problem Relation Age of Onset    Breast cancer Mother 76    Breast cancer Maternal Grandmother 48    No Known Problems Father     No Known Problems Sister     No Known Problems Maternal Grandfather     No Known Problems Paternal Grandmother     No Known Problems Paternal Grandfather     No Known Problems Sister     No Known Problems Paternal Aunt        Review of Systems   Constitutional: Negative for chills, diaphoresis, fatigue and fever  Respiratory: Negative for apnea, cough, chest tightness, shortness of breath and wheezing  Cardiovascular: Negative for chest pain, palpitations and leg swelling  Gastrointestinal: Negative for abdominal distention, abdominal pain, anal bleeding, constipation, diarrhea, nausea, rectal pain and vomiting  Genitourinary: Negative for difficulty urinating, dyspareunia, dysuria, frequency, hematuria, menstrual problem, pelvic pain, urgency, vaginal bleeding, vaginal discharge and vaginal pain     Musculoskeletal: Negative for arthralgias, back pain and myalgias  Skin: Negative for color change and rash  Neurological: Negative for dizziness, syncope, light-headedness, numbness and headaches  Hematological: Negative for adenopathy  Does not bruise/bleed easily  Psychiatric/Behavioral: Negative for dysphoric mood and sleep disturbance  The patient is not nervous/anxious  Objective   Physical Exam  OBGyn Exam     Objective      /78 (BP Location: Left arm, Patient Position: Sitting, Cuff Size: Adult)   Ht 4' 11" (1 499 m)   Wt 83 kg (183 lb)   BMI 36 96 kg/m²     General:   alert and oriented, in no acute distress   Neck: normal to inspection and palpation   Breast: normal appearance, no masses or tenderness   Heart:    Lungs:    Abdomen: soft, non-tender, without masses or organomegaly   Vulva: normal   Vagina: Mildly atrophic, without erythema or lesions or discharge  Cervix: Mildly atrophic, without lesions or discharge or cervicitis    No Cervical motion tenderness   Uterus: top normal size, anteverted, non-tender   Adnexa: no mass, fullness, tenderness   Rectum: negative    Psych:  Normal mood and affect   Skin:  Without obvious lesions   Eyes: symmetric, with normal movements and reactivity   Musculoskeletal:  Normal muscle tone and movements appreciated

## 2022-01-03 NOTE — PATIENT INSTRUCTIONS
Osteoporosis   WHAT YOU NEED TO KNOW:   What is osteoporosis? Osteoporosis is a long-term medical condition that causes your bones to become weak, brittle, and more likely to fracture  Osteoporosis occurs when your body absorbs more bone than it makes  It is also caused by a lack of calcium and estrogen (female hormone)  What increases my risk for osteoporosis? · Age older than 28    · Low estrogen levels    · Female gender    · Alcohol, tobacco, or caffeine    · Lack of calcium and vitamin D in your foods    · Lack of exercise    · Some illnesses, such as thyroid diseases, bone cancer, and long-term lung diseases    · Certain medicines, such as steroids, anticonvulsants, and blood-thinners    What are the signs and symptoms of osteoporosis? You may not have any signs or symptoms  You may break a bone after a muscle strain, bump, or fall  A break usually occurs in the hip, spine, or wrist  A collapsed vertebra (bone in your spine) may cause severe back pain or loss of height from bent posture  How is osteoporosis diagnosed? · Blood and urine tests  measure your calcium, vitamin D, and estrogen levels  · An x-ray or CT  may show thinned bones or a fracture  You may be given contrast liquid to help the bones show up better in the pictures  Tell the healthcare provider if you have ever had an allergic reaction to contrast liquid  Do not enter the MRI room with anything metal  Metal can cause serious injury  Tell the healthcare provider if you have any metal in or on your body  · A bone density test  compares your bone thickness with what is expected for someone of your age, gender, and ethnicity  How is osteoporosis treated? Medicines may be given to prevent bone loss, build new bone, and increase estrogen  These medicines help prevent fractures and may be given as a pill or injection  Ask your healthcare provider for more information on these medicines  How can I help prevent bone loss? · Eat healthy foods that are high in calcium  This helps keep your bones strong  Good sources of calcium are milk, cheese, broccoli, tofu, almonds, and canned salmon and sardines  · Increase your vitamin D intake  Vitamin D is in fish oils, some vegetables, and fortified milk, cereal, and bread  Vitamin D is also formed in the skin when it is exposed to the sun  Ask your healthcare provider how much sunlight is safe for you  · Drink liquids as directed  Ask your healthcare provider how much liquid to drink each day and which liquids are best for you  Do not have alcohol or caffeine  They decrease bone mineral density, which can weaken your bones  · Exercise regularly  Ask your healthcare provider about the best exercise plan for you  Weight bearing exercise for 30 minutes, 3 times a week can help build and strengthen bone  · Do not smoke  Nicotine and other chemicals in cigarettes and cigars can cause lung damage  Ask your healthcare provider for information if you currently smoke and need help to quit  E-cigarettes or smokeless tobacco still contain nicotine  Talk to your healthcare provider before you use these products  · Go to physical therapy as directed  A physical therapist teaches you exercises to help improve movement and muscle strength  When should I call my doctor? · You have severe pain  · You have increasing pain after a fall  · You have pain when you do your daily activities  · You have questions or concerns about your condition or care  CARE AGREEMENT:   You have the right to help plan your care  Learn about your health condition and how it may be treated  Discuss treatment options with your healthcare providers to decide what care you want to receive  You always have the right to refuse treatment  The above information is an  only  It is not intended as medical advice for individual conditions or treatments   Talk to your doctor, nurse or pharmacist before following any medical regimen to see if it is safe and effective for you  © Copyright [employer] 2021 Information is for End User's use only and may not be sold, redistributed or otherwise used for commercial purposes  All illustrations and images included in CareNotes® are the copyrighted property of [employer] A M , Inc  or [facility]  Vaginal Atrophy   WHAT YOU NEED TO KNOW:   What is vaginal atrophy? Vaginal atrophy is a condition that causes thinning, drying, and inflammation of vaginal tissue  This condition is caused by decreased levels of estrogen (a female sex hormone)  Vaginal atrophy can increase your risk for vaginal and urinary tract infections  Vaginal atrophy can worsen over time if not treated  What causes or increases your risk of vaginal atrophy? · Menopause     · Medicines that lower your estrogen levels, such as those used to treat breast cancer, endometriosis, or fibroids    · Radiation to your pelvic area     · Surgery to remove the ovaries    · Breastfeeding    What are the signs and symptoms of vaginal atrophy? · Vaginal dryness, itching, and burning    · Vaginal discharge    · Pain or discomfort during sex    · Light bleeding after sex    · Burning during urination    · Frequent, sudden, strong urges to urinate    · Urinary incontinence (loss of control of your bladder)    How is vaginal atrophy diagnosed? Your healthcare provider will ask about your symptoms  A pelvic exam will be done to examine your vagina and cervix  Your healthcare provider will place a speculum into your vagina to open and examine it  A sample of discharge from your vagina may be collected and tested  A urine test may also be done  How is vaginal atrophy treated? · Over-the counter vaginal moisturizers  can help reduce dryness  Your healthcare provider may recommend that you use a vaginal moisturizer several times each week and during sex   Only use creams that are made for vaginal use  Do  not  use petroleum jelly  Lubricants can be used during sex to decrease pain and discomfort  · Estrogen  may help decrease dryness  It may also lower your risk of vaginal infections if you are going through menopause  It can also help to relieve urinary symptoms  Estrogen may be prescribed in the form of a cream, tablet, or ring  These medicines can be applied or inserted into the vagina  Estrogen can also be prescribed in the form of a pill  When should I contact my healthcare provider? · You have a foul-smelling odor coming from your vagina  · You have a thick, cheese-like discharge from your vagina  · You have itching, swelling, or redness in your vagina  · You have pain or burning when you urinate  · Your urine smells bad  · Your symptoms do not improve, or they get worse  · You have questions or concerns about your condition or care  CARE AGREEMENT:   You have the right to help plan your care  Learn about your health condition and how it may be treated  Discuss treatment options with your healthcare providers to decide what care you want to receive  You always have the right to refuse treatment  The above information is an  only  It is not intended as medical advice for individual conditions or treatments  Talk to your doctor, nurse or pharmacist before following any medical regimen to see if it is safe and effective for you  © Copyright [employer] 2021 Information is for End User's use only and may not be sold, redistributed or otherwise used for commercial purposes   All illustrations and images included in CareNotes® are the copyrighted property of [employer] A M , Inc  or [address] [place] [address] [facility]

## 2022-01-10 LAB
LAB AP GYN PRIMARY INTERPRETATION: NORMAL
Lab: NORMAL

## 2022-04-12 ENCOUNTER — HOSPITAL ENCOUNTER (OUTPATIENT)
Dept: MAMMOGRAPHY | Facility: MEDICAL CENTER | Age: 67
Discharge: HOME | End: 2022-04-12
Payer: MEDICARE

## 2022-04-12 VITALS — WEIGHT: 183 LBS | BODY MASS INDEX: 36.89 KG/M2 | HEIGHT: 59 IN

## 2022-04-12 DIAGNOSIS — Z12.31 SCREENING MAMMOGRAM, ENCOUNTER FOR: ICD-10-CM

## 2022-04-12 PROCEDURE — 77067 SCR MAMMO BI INCL CAD: CPT

## 2022-04-12 PROCEDURE — 77063 BREAST TOMOSYNTHESIS BI: CPT

## 2022-12-27 ENCOUNTER — TELEPHONE (OUTPATIENT)
Dept: SURGICAL ONCOLOGY | Facility: CLINIC | Age: 67
End: 2022-12-27